# Patient Record
Sex: FEMALE | Race: WHITE | NOT HISPANIC OR LATINO
[De-identification: names, ages, dates, MRNs, and addresses within clinical notes are randomized per-mention and may not be internally consistent; named-entity substitution may affect disease eponyms.]

---

## 2017-01-23 ENCOUNTER — MEDICATION RENEWAL (OUTPATIENT)
Age: 72
End: 2017-01-23

## 2017-04-04 ENCOUNTER — APPOINTMENT (OUTPATIENT)
Dept: ENDOCRINOLOGY | Facility: CLINIC | Age: 72
End: 2017-04-04

## 2017-04-04 VITALS
BODY MASS INDEX: 40.12 KG/M2 | HEIGHT: 62 IN | WEIGHT: 218 LBS | DIASTOLIC BLOOD PRESSURE: 60 MMHG | OXYGEN SATURATION: 98 % | HEART RATE: 77 BPM | SYSTOLIC BLOOD PRESSURE: 140 MMHG

## 2017-06-06 ENCOUNTER — MEDICATION RENEWAL (OUTPATIENT)
Age: 72
End: 2017-06-06

## 2017-08-16 ENCOUNTER — APPOINTMENT (OUTPATIENT)
Dept: ENDOCRINOLOGY | Facility: CLINIC | Age: 72
End: 2017-08-16

## 2017-09-19 ENCOUNTER — RX RENEWAL (OUTPATIENT)
Age: 72
End: 2017-09-19

## 2017-10-19 ENCOUNTER — APPOINTMENT (OUTPATIENT)
Dept: ENDOCRINOLOGY | Facility: CLINIC | Age: 72
End: 2017-10-19
Payer: MEDICARE

## 2017-10-19 VITALS — HEART RATE: 72 BPM | DIASTOLIC BLOOD PRESSURE: 80 MMHG | SYSTOLIC BLOOD PRESSURE: 134 MMHG

## 2017-10-19 LAB
GLUCOSE BLDC GLUCOMTR-MCNC: 155
HBA1C MFR BLD HPLC: 8.3

## 2017-10-19 PROCEDURE — 99214 OFFICE O/P EST MOD 30 MIN: CPT | Mod: 25

## 2017-10-19 PROCEDURE — 82962 GLUCOSE BLOOD TEST: CPT

## 2017-10-19 PROCEDURE — 83036 HEMOGLOBIN GLYCOSYLATED A1C: CPT | Mod: QW

## 2018-03-01 ENCOUNTER — APPOINTMENT (OUTPATIENT)
Dept: ENDOCRINOLOGY | Facility: CLINIC | Age: 73
End: 2018-03-01

## 2018-03-02 ENCOUNTER — MEDICATION RENEWAL (OUTPATIENT)
Age: 73
End: 2018-03-02

## 2018-03-16 ENCOUNTER — APPOINTMENT (OUTPATIENT)
Dept: UROLOGY | Facility: CLINIC | Age: 73
End: 2018-03-16
Payer: MEDICARE

## 2018-03-16 ENCOUNTER — OUTPATIENT (OUTPATIENT)
Dept: OUTPATIENT SERVICES | Facility: HOSPITAL | Age: 73
LOS: 1 days | End: 2018-03-16
Payer: MEDICARE

## 2018-03-16 VITALS
TEMPERATURE: 98.2 F | BODY MASS INDEX: 40.12 KG/M2 | RESPIRATION RATE: 17 BRPM | HEART RATE: 91 BPM | DIASTOLIC BLOOD PRESSURE: 80 MMHG | WEIGHT: 218 LBS | HEIGHT: 62 IN | SYSTOLIC BLOOD PRESSURE: 191 MMHG

## 2018-03-16 DIAGNOSIS — R35.0 FREQUENCY OF MICTURITION: ICD-10-CM

## 2018-03-16 PROCEDURE — 76775 US EXAM ABDO BACK WALL LIM: CPT

## 2018-03-16 PROCEDURE — 76775 US EXAM ABDO BACK WALL LIM: CPT | Mod: 26

## 2018-03-16 PROCEDURE — 99213 OFFICE O/P EST LOW 20 MIN: CPT | Mod: 25

## 2018-03-19 DIAGNOSIS — R31.0 GROSS HEMATURIA: ICD-10-CM

## 2018-03-19 LAB — CORE LAB FLUID CYTOLOGY: NORMAL

## 2018-03-20 LAB
APPEARANCE: ABNORMAL
BACTERIA UR CULT: ABNORMAL
BACTERIA: ABNORMAL
BILIRUBIN URINE: NEGATIVE
BLOOD URINE: ABNORMAL
COLOR: YELLOW
GLUCOSE QUALITATIVE U: 500 MG/DL
HYALINE CASTS: 3 /LPF
KETONES URINE: NEGATIVE
LEUKOCYTE ESTERASE URINE: ABNORMAL
MICROSCOPIC-UA: NORMAL
NITRITE URINE: POSITIVE
PH URINE: 5
PROTEIN URINE: 100 MG/DL
RED BLOOD CELLS URINE: 3 /HPF
SPECIFIC GRAVITY URINE: 1.02
SQUAMOUS EPITHELIAL CELLS: 4 /HPF
UROBILINOGEN URINE: NEGATIVE MG/DL
WHITE BLOOD CELLS URINE: 249 /HPF

## 2018-04-08 ENCOUNTER — FORM ENCOUNTER (OUTPATIENT)
Age: 73
End: 2018-04-08

## 2018-04-09 ENCOUNTER — APPOINTMENT (OUTPATIENT)
Dept: CT IMAGING | Facility: IMAGING CENTER | Age: 73
End: 2018-04-09
Payer: MEDICARE

## 2018-04-09 ENCOUNTER — OUTPATIENT (OUTPATIENT)
Dept: OUTPATIENT SERVICES | Facility: HOSPITAL | Age: 73
LOS: 1 days | End: 2018-04-09
Payer: MEDICARE

## 2018-04-09 DIAGNOSIS — R31.0 GROSS HEMATURIA: ICD-10-CM

## 2018-04-09 PROCEDURE — 74176 CT ABD & PELVIS W/O CONTRAST: CPT | Mod: 26

## 2018-04-09 PROCEDURE — 74176 CT ABD & PELVIS W/O CONTRAST: CPT

## 2018-04-13 ENCOUNTER — APPOINTMENT (OUTPATIENT)
Dept: UROLOGY | Facility: CLINIC | Age: 73
End: 2018-04-13
Payer: MEDICARE

## 2018-04-13 ENCOUNTER — OUTPATIENT (OUTPATIENT)
Dept: OUTPATIENT SERVICES | Facility: HOSPITAL | Age: 73
LOS: 1 days | End: 2018-04-13
Payer: MEDICARE

## 2018-04-13 VITALS
DIASTOLIC BLOOD PRESSURE: 74 MMHG | BODY MASS INDEX: 40.12 KG/M2 | HEART RATE: 78 BPM | TEMPERATURE: 98.2 F | SYSTOLIC BLOOD PRESSURE: 174 MMHG | WEIGHT: 218 LBS | HEIGHT: 62 IN | RESPIRATION RATE: 17 BRPM

## 2018-04-13 DIAGNOSIS — R31.0 GROSS HEMATURIA: ICD-10-CM

## 2018-04-13 DIAGNOSIS — R35.0 FREQUENCY OF MICTURITION: ICD-10-CM

## 2018-04-13 PROCEDURE — 52000 CYSTOURETHROSCOPY: CPT

## 2018-04-15 LAB — BACTERIA UR CULT: NORMAL

## 2018-04-16 DIAGNOSIS — R31.0 GROSS HEMATURIA: ICD-10-CM

## 2018-04-18 ENCOUNTER — RX RENEWAL (OUTPATIENT)
Age: 73
End: 2018-04-18

## 2018-04-20 ENCOUNTER — APPOINTMENT (OUTPATIENT)
Dept: ENDOCRINOLOGY | Facility: CLINIC | Age: 73
End: 2018-04-20
Payer: MEDICARE

## 2018-04-20 VITALS
WEIGHT: 222 LBS | SYSTOLIC BLOOD PRESSURE: 124 MMHG | OXYGEN SATURATION: 98 % | HEIGHT: 62 IN | DIASTOLIC BLOOD PRESSURE: 76 MMHG | HEART RATE: 71 BPM | BODY MASS INDEX: 40.85 KG/M2

## 2018-04-20 PROCEDURE — 95251 CONT GLUC MNTR ANALYSIS I&R: CPT

## 2018-04-20 PROCEDURE — 99215 OFFICE O/P EST HI 40 MIN: CPT

## 2018-04-20 PROCEDURE — 95250 CONT GLUC MNTR PHYS/QHP EQP: CPT

## 2018-04-20 RX ORDER — AMOXICILLIN AND CLAVULANATE POTASSIUM 875; 125 MG/1; MG/1
875-125 TABLET, COATED ORAL
Qty: 14 | Refills: 0 | Status: DISCONTINUED | OUTPATIENT
Start: 2018-03-20 | End: 2018-04-20

## 2018-04-30 ENCOUNTER — APPOINTMENT (OUTPATIENT)
Dept: INTERNAL MEDICINE | Facility: CLINIC | Age: 73
End: 2018-04-30
Payer: MEDICARE

## 2018-04-30 VITALS
BODY MASS INDEX: 41.56 KG/M2 | HEART RATE: 76 BPM | HEIGHT: 61.5 IN | TEMPERATURE: 98.4 F | WEIGHT: 223 LBS | OXYGEN SATURATION: 93 %

## 2018-04-30 DIAGNOSIS — Z92.29 PERSONAL HISTORY OF OTHER DRUG THERAPY: ICD-10-CM

## 2018-04-30 DIAGNOSIS — R22.42 LOCALIZED SWELLING, MASS AND LUMP, LEFT LOWER LIMB: ICD-10-CM

## 2018-04-30 PROCEDURE — 99214 OFFICE O/P EST MOD 30 MIN: CPT

## 2018-04-30 PROCEDURE — G0439: CPT

## 2018-04-30 RX ORDER — PITAVASTATIN CALCIUM 4.18 MG/1
4 TABLET, FILM COATED ORAL
Qty: 30 | Refills: 0 | Status: DISCONTINUED | COMMUNITY
Start: 2017-10-26 | End: 2018-04-30

## 2018-04-30 RX ORDER — CAMPHOR 0.45 %
25 GEL (GRAM) TOPICAL ONCE
Qty: 2 | Refills: 0 | Status: DISCONTINUED | COMMUNITY
Start: 2018-03-16 | End: 2018-04-30

## 2018-04-30 RX ORDER — PREDNISONE 50 MG/1
50 TABLET ORAL EVERY 6 HOURS
Qty: 3 | Refills: 0 | Status: DISCONTINUED | COMMUNITY
Start: 2018-03-16 | End: 2018-04-30

## 2018-05-23 VITALS — DIASTOLIC BLOOD PRESSURE: 60 MMHG | SYSTOLIC BLOOD PRESSURE: 152 MMHG

## 2018-07-25 ENCOUNTER — APPOINTMENT (OUTPATIENT)
Dept: ENDOCRINOLOGY | Facility: CLINIC | Age: 73
End: 2018-07-25

## 2018-08-10 ENCOUNTER — APPOINTMENT (OUTPATIENT)
Dept: RADIOLOGY | Facility: IMAGING CENTER | Age: 73
End: 2018-08-10
Payer: MEDICARE

## 2018-08-10 ENCOUNTER — OUTPATIENT (OUTPATIENT)
Dept: OUTPATIENT SERVICES | Facility: HOSPITAL | Age: 73
LOS: 1 days | End: 2018-08-10
Payer: MEDICARE

## 2018-08-10 DIAGNOSIS — Z00.8 ENCOUNTER FOR OTHER GENERAL EXAMINATION: ICD-10-CM

## 2018-08-10 PROCEDURE — 74018 RADEX ABDOMEN 1 VIEW: CPT | Mod: 26

## 2018-08-10 PROCEDURE — 74018 RADEX ABDOMEN 1 VIEW: CPT

## 2018-10-04 ENCOUNTER — APPOINTMENT (OUTPATIENT)
Dept: INTERNAL MEDICINE | Facility: CLINIC | Age: 73
End: 2018-10-04
Payer: MEDICARE

## 2018-10-04 VITALS — SYSTOLIC BLOOD PRESSURE: 160 MMHG | DIASTOLIC BLOOD PRESSURE: 55 MMHG

## 2018-10-04 VITALS
WEIGHT: 211 LBS | HEART RATE: 90 BPM | HEIGHT: 61.5 IN | OXYGEN SATURATION: 93 % | TEMPERATURE: 99.2 F | BODY MASS INDEX: 39.33 KG/M2

## 2018-10-04 DIAGNOSIS — Z23 ENCOUNTER FOR IMMUNIZATION: ICD-10-CM

## 2018-10-04 PROCEDURE — 99214 OFFICE O/P EST MOD 30 MIN: CPT | Mod: 25

## 2018-10-04 PROCEDURE — 36415 COLL VENOUS BLD VENIPUNCTURE: CPT

## 2018-10-04 PROCEDURE — G0008: CPT

## 2018-10-04 PROCEDURE — 90662 IIV NO PRSV INCREASED AG IM: CPT

## 2018-10-04 NOTE — ASSESSMENT
[FreeTextEntry1] : The recent medical events were discussed and she is seeing her urologist.\par \par Discussed diet and exercise and she should try to lose weight.  Check lipids and a HgBA1c.  She is not on a statin due to muscle pains.  \par \par She is almost finished with a prolonged course of Vanco po for C.diff.  No diarrhea now.  \par \par The systolic BP is elevated but the diastolic is low.  She is seeing Dr. Martin next week.\par \par She is also going to see her pulmonologist and her ID specialist, Dr. Menon.\par \par Flu vaccine today.  \par \par She is going to see a new endocrinologist, Dr. Ariza.

## 2018-10-04 NOTE — PHYSICAL EXAM
[No Acute Distress] : no acute distress [Well Nourished] : well nourished [Well Developed] : well developed [No Respiratory Distress] : no respiratory distress  [Clear to Auscultation] : lungs were clear to auscultation bilaterally [No Accessory Muscle Use] : no accessory muscle use [Normal Rate] : normal rate  [Regular Rhythm] : with a regular rhythm [Normal S1, S2] : normal S1 and S2 [Pedal Pulses Present] : the pedal pulses are present [Soft] : abdomen soft [Non Tender] : non-tender [Non-distended] : non-distended [No HSM] : no HSM [Normal Gait] : normal gait [Coordination Grossly Intact] : coordination grossly intact [No Focal Deficits] : no focal deficits [de-identified] : Obese [de-identified] : Trace edema B/L

## 2018-10-16 ENCOUNTER — RX RENEWAL (OUTPATIENT)
Age: 73
End: 2018-10-16

## 2018-10-17 LAB
ALBUMIN SERPL ELPH-MCNC: 4.1 G/DL
ALP BLD-CCNC: 77 U/L
ALT SERPL-CCNC: 18 U/L
ANION GAP SERPL CALC-SCNC: 17 MMOL/L
AST SERPL-CCNC: 20 U/L
BASOPHILS # BLD AUTO: 0.05 K/UL
BASOPHILS NFR BLD AUTO: 0.3 %
BILIRUB SERPL-MCNC: 0.3 MG/DL
BUN SERPL-MCNC: 24 MG/DL
CALCIUM SERPL-MCNC: 10 MG/DL
CHLORIDE SERPL-SCNC: 94 MMOL/L
CHOLEST SERPL-MCNC: 277 MG/DL
CHOLEST/HDLC SERPL: 6 RATIO
CO2 SERPL-SCNC: 25 MMOL/L
CREAT SERPL-MCNC: 1.22 MG/DL
EOSINOPHIL # BLD AUTO: 0.33 K/UL
EOSINOPHIL NFR BLD AUTO: 2.1 %
FERRITIN SERPL-MCNC: 236 NG/ML
GLUCOSE SERPL-MCNC: 330 MG/DL
HBA1C MFR BLD HPLC: 7.4 %
HCT VFR BLD CALC: 32.8 %
HDLC SERPL-MCNC: 46 MG/DL
HGB BLD-MCNC: 9.7 G/DL
IMM GRANULOCYTES NFR BLD AUTO: 1.4 %
IRON SATN MFR SERPL: 13 %
IRON SERPL-MCNC: 42 UG/DL
LDLC SERPL CALC-MCNC: NORMAL
LYMPHOCYTES # BLD AUTO: 3.36 K/UL
LYMPHOCYTES NFR BLD AUTO: 21.7 %
MAN DIFF?: NORMAL
MCHC RBC-ENTMCNC: 26.5 PG
MCHC RBC-ENTMCNC: 29.6 GM/DL
MCV RBC AUTO: 89.6 FL
MONOCYTES # BLD AUTO: 0.85 K/UL
MONOCYTES NFR BLD AUTO: 5.5 %
NEUTROPHILS # BLD AUTO: 10.67 K/UL
NEUTROPHILS NFR BLD AUTO: 69 %
PLATELET # BLD AUTO: 402 K/UL
POTASSIUM SERPL-SCNC: 4.7 MMOL/L
PROT SERPL-MCNC: 7.8 G/DL
RBC # BLD: 3.66 M/UL
RBC # FLD: 16.1 %
SODIUM SERPL-SCNC: 136 MMOL/L
T4 FREE SERPL-MCNC: 1.2 NG/DL
TIBC SERPL-MCNC: 314 UG/DL
TRIGL SERPL-MCNC: 458 MG/DL
TSH SERPL-ACNC: 2.06 UIU/ML
UIBC SERPL-MCNC: 272 UG/DL
WBC # FLD AUTO: 15.47 K/UL

## 2018-11-13 ENCOUNTER — RX RENEWAL (OUTPATIENT)
Age: 73
End: 2018-11-13

## 2018-11-19 ENCOUNTER — APPOINTMENT (OUTPATIENT)
Dept: ENDOCRINOLOGY | Facility: CLINIC | Age: 73
End: 2018-11-19

## 2019-01-23 ENCOUNTER — RX RENEWAL (OUTPATIENT)
Age: 74
End: 2019-01-23

## 2019-03-06 ENCOUNTER — APPOINTMENT (OUTPATIENT)
Dept: ENDOCRINOLOGY | Facility: CLINIC | Age: 74
End: 2019-03-06

## 2019-04-12 ENCOUNTER — RX RENEWAL (OUTPATIENT)
Age: 74
End: 2019-04-12

## 2019-04-15 ENCOUNTER — APPOINTMENT (OUTPATIENT)
Dept: INTERNAL MEDICINE | Facility: CLINIC | Age: 74
End: 2019-04-15
Payer: MEDICARE

## 2019-04-15 VITALS
OXYGEN SATURATION: 93 % | HEART RATE: 79 BPM | TEMPERATURE: 97.4 F | HEIGHT: 61 IN | WEIGHT: 216 LBS | BODY MASS INDEX: 40.78 KG/M2

## 2019-04-15 VITALS — SYSTOLIC BLOOD PRESSURE: 150 MMHG | DIASTOLIC BLOOD PRESSURE: 60 MMHG

## 2019-04-15 VITALS — SYSTOLIC BLOOD PRESSURE: 145 MMHG | DIASTOLIC BLOOD PRESSURE: 55 MMHG

## 2019-04-15 DIAGNOSIS — Z23 ENCOUNTER FOR IMMUNIZATION: ICD-10-CM

## 2019-04-15 PROCEDURE — 90732 PPSV23 VACC 2 YRS+ SUBQ/IM: CPT

## 2019-04-15 PROCEDURE — G0009: CPT

## 2019-04-15 PROCEDURE — 99214 OFFICE O/P EST MOD 30 MIN: CPT | Mod: 25

## 2019-04-15 PROCEDURE — 36415 COLL VENOUS BLD VENIPUNCTURE: CPT

## 2019-04-15 RX ORDER — ROSUVASTATIN CALCIUM 5 MG/1
5 TABLET, FILM COATED ORAL
Qty: 30 | Refills: 5 | Status: DISCONTINUED | COMMUNITY
Start: 2018-04-30 | End: 2019-04-15

## 2019-04-15 NOTE — HISTORY OF PRESENT ILLNESS
[FreeTextEntry1] : The patient is here for a routine visit.  [de-identified] : She has not been careful with her diet.  She is unable to exercise given her neuropathy and other medical problems.  \par \par She cancelled two appointments with endocrinologists in the last few months and she has not tried to reschedule.  Glucoeses at home are 150-200+\par \par She has seen Dr. Martin.\par \par She last saw her urologist in the fall.  No new  symptoms.  \par \par She has seen her pulmonologist and she is off the supplemental oxygen.

## 2019-04-15 NOTE — ASSESSMENT
[FreeTextEntry1] : She has not seen an endocrinologist and she has not been careful with her diet.  She was urged to be more careful and see an endocrinologist- she had cancelled two appointments.  She says she is taking her medication.  Check a HgBA1c.  She sees an ophthalmologist. \par \par She continues to decline a statin due to aches.  Will try Zetia 10 mg.  Check lipids.\par \par The systolic BP is a little high but overall acceptable  She sees her cardiologist.\par \par Pneumovax today- had several years ago.  S/p Prevnar.\par \par She isn't on an ASA due to GI bleeding.  \par \par Check TFTs on Levothyroxine.  \par \par She has a 24/7 aide at home.

## 2019-04-15 NOTE — PHYSICAL EXAM
[No Acute Distress] : no acute distress [Well Nourished] : well nourished [Well Developed] : well developed [No Respiratory Distress] : no respiratory distress  [Clear to Auscultation] : lungs were clear to auscultation bilaterally [No Accessory Muscle Use] : no accessory muscle use [Normal Rate] : normal rate  [Regular Rhythm] : with a regular rhythm [Soft] : abdomen soft [Pedal Pulses Present] : the pedal pulses are present [Normal S1, S2] : normal S1 and S2 [Non-distended] : non-distended [Non Tender] : non-tender [No HSM] : no HSM [No Focal Deficits] : no focal deficits [Normal Gait] : normal gait [de-identified] : obese [de-identified] : 1+ edema B/L

## 2019-04-18 LAB
ALBUMIN SERPL ELPH-MCNC: 4.2 G/DL
ALP BLD-CCNC: 88 U/L
ALT SERPL-CCNC: 19 U/L
ANION GAP SERPL CALC-SCNC: 15 MMOL/L
AST SERPL-CCNC: 19 U/L
BASOPHILS # BLD AUTO: 0.07 K/UL
BASOPHILS NFR BLD AUTO: 0.4 %
BILIRUB SERPL-MCNC: 0.2 MG/DL
BUN SERPL-MCNC: 30 MG/DL
CALCIUM SERPL-MCNC: 9.7 MG/DL
CHLORIDE SERPL-SCNC: 99 MMOL/L
CHOLEST SERPL-MCNC: 295 MG/DL
CHOLEST/HDLC SERPL: 6.9 RATIO
CO2 SERPL-SCNC: 26 MMOL/L
CREAT SERPL-MCNC: 1.21 MG/DL
EOSINOPHIL # BLD AUTO: 0.2 K/UL
EOSINOPHIL NFR BLD AUTO: 1.1 %
ESTIMATED AVERAGE GLUCOSE: 209 MG/DL
GLUCOSE SERPL-MCNC: 290 MG/DL
HBA1C MFR BLD HPLC: 8.9 %
HCT VFR BLD CALC: 34.1 %
HCV AB SER QL: NONREACTIVE
HCV S/CO RATIO: 0.14 S/CO
HDLC SERPL-MCNC: 43 MG/DL
HGB BLD-MCNC: 9.8 G/DL
IMM GRANULOCYTES NFR BLD AUTO: 2 %
LDLC SERPL CALC-MCNC: NORMAL MG/DL
LYMPHOCYTES # BLD AUTO: 3 K/UL
LYMPHOCYTES NFR BLD AUTO: 17 %
MAN DIFF?: NORMAL
MCHC RBC-ENTMCNC: 26 PG
MCHC RBC-ENTMCNC: 28.7 GM/DL
MCV RBC AUTO: 90.5 FL
MONOCYTES # BLD AUTO: 0.86 K/UL
MONOCYTES NFR BLD AUTO: 4.9 %
NEUTROPHILS # BLD AUTO: 13.19 K/UL
NEUTROPHILS NFR BLD AUTO: 74.6 %
PLATELET # BLD AUTO: 346 K/UL
POTASSIUM SERPL-SCNC: 4.9 MMOL/L
PROT SERPL-MCNC: 6.9 G/DL
RBC # BLD: 3.77 M/UL
RBC # FLD: 15.2 %
SODIUM SERPL-SCNC: 140 MMOL/L
T4 FREE SERPL-MCNC: 1.2 NG/DL
TRIGL SERPL-MCNC: 414 MG/DL
TSH SERPL-ACNC: 2.1 UIU/ML
WBC # FLD AUTO: 17.67 K/UL

## 2019-05-10 ENCOUNTER — OUTPATIENT (OUTPATIENT)
Dept: OUTPATIENT SERVICES | Facility: HOSPITAL | Age: 74
LOS: 1 days | Discharge: HOME | End: 2019-05-10

## 2019-05-10 DIAGNOSIS — D72.829 ELEVATED WHITE BLOOD CELL COUNT, UNSPECIFIED: ICD-10-CM

## 2019-05-14 ENCOUNTER — RESULT REVIEW (OUTPATIENT)
Age: 74
End: 2019-05-14

## 2019-05-14 ENCOUNTER — APPOINTMENT (OUTPATIENT)
Dept: HEMATOLOGY ONCOLOGY | Facility: CLINIC | Age: 74
End: 2019-05-14
Payer: MEDICARE

## 2019-05-14 ENCOUNTER — OUTPATIENT (OUTPATIENT)
Dept: OUTPATIENT SERVICES | Facility: HOSPITAL | Age: 74
LOS: 1 days | End: 2019-05-14
Payer: MEDICARE

## 2019-05-14 VITALS
SYSTOLIC BLOOD PRESSURE: 156 MMHG | DIASTOLIC BLOOD PRESSURE: 84 MMHG | HEIGHT: 61.2 IN | TEMPERATURE: 98.6 F | HEART RATE: 75 BPM | OXYGEN SATURATION: 96 % | BODY MASS INDEX: 42.04 KG/M2 | RESPIRATION RATE: 16 BRPM | WEIGHT: 222.64 LBS

## 2019-05-14 DIAGNOSIS — Z63.4 DISAPPEARANCE AND DEATH OF FAMILY MEMBER: ICD-10-CM

## 2019-05-14 DIAGNOSIS — D72.829 ELEVATED WHITE BLOOD CELL COUNT, UNSPECIFIED: ICD-10-CM

## 2019-05-14 LAB
HCT VFR BLD CALC: 27.4 % — LOW (ref 34.5–45)
HGB BLD-MCNC: 10 G/DL — LOW (ref 11.5–15.5)
MCHC RBC-ENTMCNC: 28.9 PG — SIGNIFICANT CHANGE UP (ref 27–34)
MCHC RBC-ENTMCNC: 36.5 G/DL — HIGH (ref 32–36)
MCV RBC AUTO: 79.1 FL — LOW (ref 80–100)
PLATELET # BLD AUTO: 320 K/UL — SIGNIFICANT CHANGE UP (ref 150–400)
RBC # BLD: 3.47 M/UL — LOW (ref 3.8–5.2)
RBC # FLD: 14.5 % — SIGNIFICANT CHANGE UP (ref 10.3–14.5)
WBC # BLD: 16 K/UL — HIGH (ref 3.8–10.5)
WBC # FLD AUTO: 16 K/UL — HIGH (ref 3.8–10.5)

## 2019-05-14 PROCEDURE — 99205 OFFICE O/P NEW HI 60 MIN: CPT

## 2019-05-14 PROCEDURE — 81270 JAK2 GENE: CPT

## 2019-05-14 PROCEDURE — 81207 BCR/ABL1 GENE MINOR BP: CPT

## 2019-05-14 PROCEDURE — 88184 FLOWCYTOMETRY/ TC 1 MARKER: CPT

## 2019-05-14 PROCEDURE — 88185 FLOWCYTOMETRY/TC ADD-ON: CPT

## 2019-05-14 PROCEDURE — 87205 SMEAR GRAM STAIN: CPT

## 2019-05-14 PROCEDURE — G0452: CPT | Mod: 26

## 2019-05-14 PROCEDURE — 81206 BCR/ABL1 GENE MAJOR BP: CPT

## 2019-05-14 RX ORDER — EZETIMIBE 10 MG/1
10 TABLET ORAL
Qty: 90 | Refills: 3 | Status: DISCONTINUED | COMMUNITY
Start: 2019-04-15 | End: 2019-05-14

## 2019-05-14 SDOH — SOCIAL STABILITY - SOCIAL INSECURITY: DISSAPEARANCE AND DEATH OF FAMILY MEMBER: Z63.4

## 2019-05-14 NOTE — REVIEW OF SYSTEMS
[Fatigue] : fatigue [Lower Ext Edema] : lower extremity edema [Shortness Of Breath] : shortness of breath [SOB on Exertion] : shortness of breath during exertion [Dysuria] : dysuria [Joint Stiffness] : joint stiffness [Difficulty Walking] : difficulty walking [Anxiety] : anxiety [Depression] : depression [Easy Bruising] : a tendency for easy bruising [Negative] : Allergic/Immunologic [Night Sweats] : no night sweats [Fever] : no fever [Chills] : no chills [Eye Pain] : no eye pain [Red Eyes] : eyes not red [Recent Change In Weight] : ~T no recent weight change [Dysphagia] : no dysphagia [Dry Eyes] : no dryness of the eyes [Vision Problems] : no vision problems [Loss of Hearing] : no loss of hearing [Hoarseness] : no hoarseness [Nosebleeds] : no nosebleeds [Odynophagia] : no odynophagia [Mucosal Pain] : no mucosal pain [Chest Pain] : no chest pain [Wheezing] : no wheezing [Leg Claudication] : no intermittent leg claudication [Palpitations] : no palpitations [Cough] : no cough [Vaginal Discharge] : no vaginal discharge [Incontinence] : no incontinence [Joint Pain] : no joint pain [Muscle Pain] : no muscle pain [Dysmenorrhea/Abn Vaginal Bleeding] : no dysmenorrhea/abnormal vaginal bleeding [Skin Rash] : no skin rash [Dizziness] : no dizziness [Confused] : no confusion [Skin Wound] : no skin wound [Suicidal] : not suicidal [Fainting] : no fainting [Insomnia] : no insomnia [Hot Flashes] : no hot flashes [Proptosis] : no proptosis [Easy Bleeding] : no tendency for easy bleeding [Deepening Of The Voice] : no deepening of the voice [Muscle Weakness] : no muscle weakness [FreeTextEntry3] : CL until age 62; difficulty to read [Swollen Glands] : no swollen glands [FreeTextEntry9] : legs since last summer [FreeTextEntry6] : does not climb stairs [FreeTextEntry8] : kidney stone [de-identified] : walker used at home [de-identified] :   [FreeTextEntry1] : sensitive to chemicals, cigarettes and cats

## 2019-05-14 NOTE — RESULTS/DATA
[FreeTextEntry1] : WBC 16.0 HGB 10  000\par review of peripheral blood smear shows occasional large granulocytic cell including at least one myelocyte.No basophils are observed and no myeloblasts are noted. ;consideration of promyelocytes or LGL.

## 2019-05-14 NOTE — CONSULT LETTER
[Consult Letter:] : I had the pleasure of evaluating your patient, [unfilled]. [Dear  ___] : Dear  [unfilled], [Sincerely,] : Sincerely, [Consult Closing:] : Thank you very much for allowing me to participate in the care of this patient.  If you have any questions, please do not hesitate to contact me. [Please see my note below.] : Please see my note below. [FreeTextEntry2] : Cale Tipton MD\par [address]\par [facility] [address]\par [facility] [address] [FreeTextEntry3] : Donaldo Cheng

## 2019-05-14 NOTE — ASSESSMENT
[Supportive] : Goals of care discussed with patient: Supportive [Palliative Care Plan] : not applicable at this time [FreeTextEntry1] : 74 year old female with a history dating to 2013 of mild anemia and mild elevation of white cell count. She had a bone marrow aspiration and biopsy over 20 years ago in [place] to evaluate her anemia but she is unaware of the results as she did not return for follow up. She is not aware of white cell elevation at that time. She occasionally took prednisone but not in recent several months. Recommend lymphocyte flow cytometry, GUILHERME 2 and BCR ABL testing.\par She has a physical examination which is unchanged from prior records. There is no lymphadenopathy. I cannot palpate any abdominal organ enlargement due to voluntary guarding\par Patient seen with A Ricardo VALERIO\par RTC 2 weeks

## 2019-05-14 NOTE — HISTORY OF PRESENT ILLNESS
[Cardiovascular] : Cardiovascular [Constitutional] : Constitutional [ENT] : ENT [Dermatologic] : Dermatologic [Endocrine] : Endocrine [Genitourinary] : Genitourinary [Gastrointestinal] : Gastrointestinal [Musculoskeletal] : Musculoskeletal [Gynecologic] : Gynecologic [Infectious] : Infectious [Pain] : Pain [Pulmonary] : Pulmonary [Neurologic] : Neurologic [Date: ____________] : Patient's last distress assessment performed on [unfilled]. [Hematologic] : Hematologic [0 - No Distress] : Distress Level: 0 [Disease:__________________________] : Disease: [unfilled] [de-identified] : The patient was referred by Dr Tipton for elevation of the white cell count.\par The patient was seen in [facility] on 06/29 /2018 and she remained in the hospital for 30 days. She had kidney stone, sepsis; she had coma for 3 days. She remained on antibiotics. She had a readmission to [facility] in July 2018 for C diff (this was after discharge from rehabilitation). 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 She has had mild anemia in association with her diagnosis of diabetes. She is not diagnosed to have renal insufficiency although her predicted G F R is 40 ml/min. \par She was diagnosed to have diabetes 10 years ago and she was placed on insulin about 4 years ago when she had kidney stones. \par Patient also admitted to\par Basal cell cancer resection above left eyebrow 15 years ago \par  [FreeTextEntry1] : initial visit [de-identified] : She has had significant fatigue particularly with walking since 10 months ago and her three hospitalizations. She monitors her blood sugar but occasional non fasting blood sugars were noted at 290 in office visits. She has not had transfusion since her hospitalzation. hemoglobins have been below normal since 2013. THere is no history of urinary bleeding or gastrointestinal bleeding

## 2019-05-15 LAB — TM INTERPRETATION: SIGNIFICANT CHANGE UP

## 2019-05-20 LAB — BCR/ABL BY RT - PCR QUANTITATIVE: SIGNIFICANT CHANGE UP

## 2019-05-24 LAB — JAK2 P.V617F BLD/T QL: SIGNIFICANT CHANGE UP

## 2019-06-08 ENCOUNTER — OUTPATIENT (OUTPATIENT)
Dept: OUTPATIENT SERVICES | Facility: HOSPITAL | Age: 74
LOS: 1 days | Discharge: HOME | End: 2019-06-08

## 2019-06-08 DIAGNOSIS — D72.829 ELEVATED WHITE BLOOD CELL COUNT, UNSPECIFIED: ICD-10-CM

## 2019-06-10 NOTE — HISTORY OF PRESENT ILLNESS
[Disease:__________________________] : Disease: [unfilled] [de-identified] : The patient was referred by Dr Tipton for elevation of the white cell count.\par The patient was seen in [facility] on 06/29 /2018 and she remained in the hospital for 30 days. She had kidney stone, sepsis; she had coma for 3 days. She remained on antibiotics. She had a readmission to [facility] in July 2018 for C diff (this was after discharge from rehabilitation). 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 She has had mild anemia in association with her diagnosis of diabetes. She is not diagnosed to have renal insufficiency although her predicted G F R is 40 ml/min. \par She was diagnosed to have diabetes 10 years ago and she was placed on insulin about 4 years ago when she had kidney stones. \par Patient also admitted to\par Basal cell cancer resection above left eyebrow 15 years ago \par

## 2019-06-12 ENCOUNTER — APPOINTMENT (OUTPATIENT)
Dept: HEMATOLOGY ONCOLOGY | Facility: CLINIC | Age: 74
End: 2019-06-12
Payer: MEDICARE

## 2019-06-19 ENCOUNTER — APPOINTMENT (OUTPATIENT)
Dept: HEMATOLOGY ONCOLOGY | Facility: CLINIC | Age: 74
End: 2019-06-19
Payer: MEDICARE

## 2019-07-05 ENCOUNTER — RX RENEWAL (OUTPATIENT)
Age: 74
End: 2019-07-05

## 2019-07-15 ENCOUNTER — OUTPATIENT (OUTPATIENT)
Dept: OUTPATIENT SERVICES | Facility: HOSPITAL | Age: 74
LOS: 1 days | Discharge: HOME | End: 2019-07-15

## 2019-07-15 DIAGNOSIS — D72.829 ELEVATED WHITE BLOOD CELL COUNT, UNSPECIFIED: ICD-10-CM

## 2019-07-24 ENCOUNTER — RESULT REVIEW (OUTPATIENT)
Age: 74
End: 2019-07-24

## 2019-07-24 ENCOUNTER — APPOINTMENT (OUTPATIENT)
Dept: HEMATOLOGY ONCOLOGY | Facility: CLINIC | Age: 74
End: 2019-07-24
Payer: MEDICARE

## 2019-07-24 VITALS
HEART RATE: 72 BPM | SYSTOLIC BLOOD PRESSURE: 159 MMHG | OXYGEN SATURATION: 98 % | TEMPERATURE: 98.8 F | WEIGHT: 219.36 LBS | RESPIRATION RATE: 16 BRPM | BODY MASS INDEX: 41.18 KG/M2 | DIASTOLIC BLOOD PRESSURE: 74 MMHG

## 2019-07-24 LAB
BASOPHILS # BLD AUTO: 0 K/UL — SIGNIFICANT CHANGE UP (ref 0–0.2)
BASOPHILS NFR BLD AUTO: 0.3 % — SIGNIFICANT CHANGE UP (ref 0–2)
EOSINOPHIL # BLD AUTO: 0.3 K/UL — SIGNIFICANT CHANGE UP (ref 0–0.5)
EOSINOPHIL NFR BLD AUTO: 2.1 % — SIGNIFICANT CHANGE UP (ref 0–6)
HCT VFR BLD CALC: 29.8 % — LOW (ref 34.5–45)
HGB BLD-MCNC: 9.8 G/DL — LOW (ref 11.5–15.5)
LYMPHOCYTES # BLD AUTO: 23 % — SIGNIFICANT CHANGE UP (ref 13–44)
LYMPHOCYTES # BLD AUTO: 3 K/UL — SIGNIFICANT CHANGE UP (ref 1–3.3)
MCHC RBC-ENTMCNC: 26.5 PG — LOW (ref 27–34)
MCHC RBC-ENTMCNC: 32.7 G/DL — SIGNIFICANT CHANGE UP (ref 32–36)
MCV RBC AUTO: 81 FL — SIGNIFICANT CHANGE UP (ref 80–100)
MONOCYTES # BLD AUTO: 0.7 K/UL — SIGNIFICANT CHANGE UP (ref 0–0.9)
MONOCYTES NFR BLD AUTO: 5.2 % — SIGNIFICANT CHANGE UP (ref 2–14)
NEUTROPHILS # BLD AUTO: 9 K/UL — HIGH (ref 1.8–7.4)
NEUTROPHILS NFR BLD AUTO: 69.4 % — SIGNIFICANT CHANGE UP (ref 43–77)
PLATELET # BLD AUTO: 323 K/UL — SIGNIFICANT CHANGE UP (ref 150–400)
RBC # BLD: 3.68 M/UL — LOW (ref 3.8–5.2)
RBC # FLD: 15 % — HIGH (ref 10.3–14.5)
WBC # BLD: 13 K/UL — HIGH (ref 3.8–10.5)
WBC # FLD AUTO: 13 K/UL — HIGH (ref 3.8–10.5)

## 2019-07-24 PROCEDURE — 99214 OFFICE O/P EST MOD 30 MIN: CPT

## 2019-07-24 NOTE — HISTORY OF PRESENT ILLNESS
[Disease:__________________________] : Disease: [unfilled] [Cardiovascular] : Cardiovascular [ENT] : ENT [Constitutional] : Constitutional [Dermatologic] : Dermatologic [Endocrine] : Endocrine [Gastrointestinal] : Gastrointestinal [Gynecologic] : Gynecologic [Infectious] : Infectious [Genitourinary] : Genitourinary [Neurologic] : Neurologic [Musculoskeletal] : Musculoskeletal [Pain] : Pain [Pulmonary] : Pulmonary [Date: ____________] : Patient's last distress assessment performed on [unfilled]. [Hematologic] : Hematologic [0 - No Distress] : Distress Level: 0 [de-identified] : The patient was referred by Dr Tipton for elevation of the white cell count.\par The patient was seen in [facility] on 06/29 /2018 and she remained in the hospital for 30 days. She had kidney stone, sepsis; she had coma for 3 days. She remained on antibiotics. She had a readmission to [facility] in July 2018 for C diff (this was after discharge from rehabilitation). 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 She has had mild anemia in association with her diagnosis of diabetes. She is not diagnosed to have renal insufficiency although her predicted G F R is 40 ml/min. \par She was diagnosed to have diabetes 10 years ago and she was placed on insulin about 4 years ago when she had kidney stones. \par Patient also admitted to\par Basal cell cancer resection above left eyebrow 15 years ago \par  [de-identified] : She has not had hospitalization since her last visit in may 2019; She has tested normal for J AK, B CR A B L, and flow cytometry,\par She has noted subcutaneous on thighs and upper arms. The patient has given insulin only in the arms.\par No fever and no weight loss [FreeTextEntry1] : follow up visit

## 2019-07-24 NOTE — PHYSICAL EXAM
[Restricted in physically strenuous activity but ambulatory and able to carry out work of a light or sedentary nature] : Status 1- Restricted in physically strenuous activity but ambulatory and able to carry out work of a light or sedentary nature, e.g., light house work, office work [Normal] : affect appropriate [de-identified] : patient refused physical examination

## 2019-07-24 NOTE — RESULTS/DATA
[FreeTextEntry1] : copies of normal leucocyte flow cytometry, J A K 2 , B CR A B L given to the patient with an explanation.\par WBC 13 HGB 9.8  000

## 2019-07-24 NOTE — REVIEW OF SYSTEMS
[Fatigue] : fatigue [Vision Problems] : vision problems [Shortness Of Breath] : shortness of breath [Lower Ext Edema] : lower extremity edema [SOB on Exertion] : shortness of breath during exertion [Anxiety] : anxiety [Difficulty Walking] : difficulty walking [Swollen Glands] : swollen glands [Negative] : Allergic/Immunologic [Skin Rash] : no skin rash [Depression] : no depression [Proptosis] : no proptosis [Skin Wound] : no skin wound [Muscle Weakness] : no muscle weakness [Hot Flashes] : no hot flashes [Easy Bleeding] : no tendency for easy bleeding [Deepening Of The Voice] : no deepening of the voice [Easy Bruising] : no tendency for easy bruising [de-identified] : she uses a cane [de-identified] : hypothyroidism

## 2019-07-24 NOTE — ASSESSMENT
[Supportive] : Goals of care discussed with patient: Supportive [Palliative Care Plan] : not applicable at this time [FreeTextEntry1] : 74 year old female with a history of hypothyroidism and an elevation of the white cell count. Examination of the peripheral smear showed normal findings.\par Causes of mild anemia present since 2016 may include diabetes and chronic disease. According to the patient she has not had bleeding in rectum. She did had kidNEy bleeding and she was transfused at [facility].\par The patient probably has anemia of chronic disease (diabetes). White blood cell elevation is showing normal findings on flow cytometry.\par She will continue on her current diet and monitor her blood sugar. Vital signs were reviewed and patient is reminded to continue on her blood pressure medication.\par RTC 6 months

## 2019-08-14 ENCOUNTER — OUTPATIENT (OUTPATIENT)
Dept: OUTPATIENT SERVICES | Facility: HOSPITAL | Age: 74
LOS: 1 days | End: 2019-08-14
Payer: MEDICARE

## 2019-08-14 ENCOUNTER — APPOINTMENT (OUTPATIENT)
Dept: ULTRASOUND IMAGING | Facility: CLINIC | Age: 74
End: 2019-08-14
Payer: MEDICARE

## 2019-08-14 DIAGNOSIS — N20.0 CALCULUS OF KIDNEY: ICD-10-CM

## 2019-08-14 PROCEDURE — 76775 US EXAM ABDO BACK WALL LIM: CPT | Mod: 26

## 2019-08-14 PROCEDURE — 76775 US EXAM ABDO BACK WALL LIM: CPT

## 2019-09-17 ENCOUNTER — RX RENEWAL (OUTPATIENT)
Age: 74
End: 2019-09-17

## 2019-10-10 ENCOUNTER — RX RENEWAL (OUTPATIENT)
Age: 74
End: 2019-10-10

## 2019-10-10 ENCOUNTER — APPOINTMENT (OUTPATIENT)
Dept: ENDOCRINOLOGY | Facility: CLINIC | Age: 74
End: 2019-10-10
Payer: MEDICARE

## 2019-10-10 VITALS
DIASTOLIC BLOOD PRESSURE: 60 MMHG | OXYGEN SATURATION: 98 % | BODY MASS INDEX: 40.55 KG/M2 | SYSTOLIC BLOOD PRESSURE: 132 MMHG | HEART RATE: 86 BPM | WEIGHT: 216 LBS

## 2019-10-10 LAB — GLUCOSE BLDC GLUCOMTR-MCNC: 333

## 2019-10-10 PROCEDURE — 83036 HEMOGLOBIN GLYCOSYLATED A1C: CPT | Mod: QW

## 2019-10-10 PROCEDURE — 82962 GLUCOSE BLOOD TEST: CPT

## 2019-10-10 PROCEDURE — 99214 OFFICE O/P EST MOD 30 MIN: CPT | Mod: 25

## 2019-10-11 ENCOUNTER — RESULT CHARGE (OUTPATIENT)
Age: 74
End: 2019-10-11

## 2019-10-14 LAB — HBA1C MFR BLD HPLC: 7.8

## 2019-10-25 ENCOUNTER — RX RENEWAL (OUTPATIENT)
Age: 74
End: 2019-10-25

## 2019-11-05 ENCOUNTER — RX RENEWAL (OUTPATIENT)
Age: 74
End: 2019-11-05

## 2019-11-15 ENCOUNTER — APPOINTMENT (OUTPATIENT)
Dept: INTERNAL MEDICINE | Facility: CLINIC | Age: 74
End: 2019-11-15
Payer: MEDICARE

## 2019-11-15 VITALS — DIASTOLIC BLOOD PRESSURE: 60 MMHG | SYSTOLIC BLOOD PRESSURE: 145 MMHG

## 2019-11-15 VITALS
BODY MASS INDEX: 40.4 KG/M2 | HEART RATE: 83 BPM | TEMPERATURE: 98 F | HEIGHT: 61 IN | WEIGHT: 214 LBS | OXYGEN SATURATION: 94 %

## 2019-11-15 PROCEDURE — G0008: CPT

## 2019-11-15 PROCEDURE — 90653 IIV ADJUVANT VACCINE IM: CPT

## 2019-11-15 PROCEDURE — 99214 OFFICE O/P EST MOD 30 MIN: CPT | Mod: 25

## 2019-11-15 PROCEDURE — 36415 COLL VENOUS BLD VENIPUNCTURE: CPT

## 2019-11-15 NOTE — ASSESSMENT
[FreeTextEntry1] : The BP is fairly good for her at 145/60.\par \par Check lipids- she can't tolerate a statin or Zetia.  Consider other medication options?  Consider the lipid center.  \par \par She requests a HgBA1c- checked a month ago and it was 7.8. Discussed diet.  Note she is not taking the Repaglinide that was prescribed last month.  She is also taking the Janumet QD (not BID as prescribed).  She is taking the insulin.\par \par Flu vaccine today.\par \par Check TFTs on Levothyroxine.\par \par She sees her cardiologist, Dr. Martin.

## 2019-11-15 NOTE — HISTORY OF PRESENT ILLNESS
[de-identified] : The patient is here for a routine visit. She says her diet has been better.  She is unable to exercise.\par \par She has a 24/7 aide at home.  She walks with a walker.  \par \par She saw Dr. Cheng for the elevated WBC and the anemia.  No new pathology found.

## 2019-11-20 LAB
ALBUMIN SERPL ELPH-MCNC: 4.5 G/DL
ALP BLD-CCNC: 89 U/L
ALT SERPL-CCNC: 21 U/L
ANION GAP SERPL CALC-SCNC: 17 MMOL/L
AST SERPL-CCNC: 21 U/L
BILIRUB SERPL-MCNC: 0.2 MG/DL
BUN SERPL-MCNC: 31 MG/DL
CALCIUM SERPL-MCNC: 9.9 MG/DL
CHLORIDE SERPL-SCNC: 97 MMOL/L
CHOLEST SERPL-MCNC: 295 MG/DL
CHOLEST/HDLC SERPL: 6.3 RATIO
CO2 SERPL-SCNC: 25 MMOL/L
CREAT SERPL-MCNC: 1.19 MG/DL
ESTIMATED AVERAGE GLUCOSE: 189 MG/DL
GLUCOSE SERPL-MCNC: 135 MG/DL
HBA1C MFR BLD HPLC: 8.2 %
HDLC SERPL-MCNC: 47 MG/DL
LDLC SERPL CALC-MCNC: 178 MG/DL
POTASSIUM SERPL-SCNC: 4.4 MMOL/L
PROT SERPL-MCNC: 6.9 G/DL
SODIUM SERPL-SCNC: 139 MMOL/L
T4 FREE SERPL-MCNC: 1.3 NG/DL
TRIGL SERPL-MCNC: 348 MG/DL
TSH SERPL-ACNC: 1.61 UIU/ML

## 2019-11-20 RX ORDER — AZILSARTAN KAMEDOXOMIL AND CHLORTHALIDONE 40; 25 MG/1; MG/1
40-25 TABLET ORAL DAILY
Qty: 90 | Refills: 3 | Status: ACTIVE | COMMUNITY
Start: 2019-11-20

## 2020-01-06 ENCOUNTER — RX RENEWAL (OUTPATIENT)
Age: 75
End: 2020-01-06

## 2020-01-15 ENCOUNTER — APPOINTMENT (OUTPATIENT)
Dept: ENDOCRINOLOGY | Facility: CLINIC | Age: 75
End: 2020-01-15

## 2020-02-03 ENCOUNTER — APPOINTMENT (OUTPATIENT)
Dept: INTERNAL MEDICINE | Facility: CLINIC | Age: 75
End: 2020-02-03
Payer: MEDICARE

## 2020-02-03 VITALS
HEIGHT: 61 IN | HEART RATE: 78 BPM | WEIGHT: 216 LBS | OXYGEN SATURATION: 94 % | BODY MASS INDEX: 40.78 KG/M2 | SYSTOLIC BLOOD PRESSURE: 140 MMHG | TEMPERATURE: 98 F | DIASTOLIC BLOOD PRESSURE: 60 MMHG

## 2020-02-03 PROCEDURE — 99214 OFFICE O/P EST MOD 30 MIN: CPT | Mod: 25

## 2020-02-03 PROCEDURE — 36415 COLL VENOUS BLD VENIPUNCTURE: CPT

## 2020-02-06 VITALS — DIASTOLIC BLOOD PRESSURE: 65 MMHG | SYSTOLIC BLOOD PRESSURE: 145 MMHG

## 2020-02-06 NOTE — HISTORY OF PRESENT ILLNESS
[de-identified] : The patient comes in because she is having dyspnea with exertion which occurs with even mild exertion.  She wonders if she is more anemic.  She has had this for a few months and it is not acute.  No chest pain or pressure, no fever, cough, orthopnea.  She has lower extremity edema which is the same.  She saw Dr. Martin in the last few months.  \par \par She has not been good with her diet and she is unable to exercise.  \par \par She has leg and back pain and she might want to see a neurologist.

## 2020-02-06 NOTE — ASSESSMENT
[FreeTextEntry1] : The patient has dyspnea with mild exertion which is not new- present for a few months.  She comes in primarily because she is concerned about worse anemia- note she sees a hematologist although she has not been there recently.  She doesn't specifically have chest pain or pressure.  The symptoms could be multifactorial and will check a CBC for the anemia and a metabolic, TFTs, BNP.  Consider a cardiac component and she was advised to follow-up with Dr. Martin.  Also check a CXR to rule out CHF and check a BNP.  Could consider a pulmonary evaluation.  Call PRN if it gets worse.\par \par Check a HgBA1c and lipids with the other blood tests.  Diet has been poor and she was counseled.  \par \par She has had the flu vaccine.

## 2020-02-06 NOTE — PHYSICAL EXAM
[Normal] : normal gait, coordination grossly intact, no focal deficits and deep tendon reflexes were 2+ and symmetric [de-identified] : obese female [de-identified] : soft systolic murmur [de-identified] : B/L 1-2 + edema.

## 2020-02-12 LAB
ALBUMIN SERPL ELPH-MCNC: 4.4 G/DL
ALP BLD-CCNC: 89 U/L
ALT SERPL-CCNC: 19 U/L
ANION GAP SERPL CALC-SCNC: 15 MMOL/L
AST SERPL-CCNC: 19 U/L
BASOPHILS # BLD AUTO: 0.06 K/UL
BASOPHILS NFR BLD AUTO: 0.4 %
BILIRUB SERPL-MCNC: 0.2 MG/DL
BUN SERPL-MCNC: 29 MG/DL
CALCIUM SERPL-MCNC: 9.7 MG/DL
CHLORIDE SERPL-SCNC: 98 MMOL/L
CHOLEST SERPL-MCNC: 280 MG/DL
CHOLEST/HDLC SERPL: 6.2 RATIO
CO2 SERPL-SCNC: 26 MMOL/L
CREAT SERPL-MCNC: 1.51 MG/DL
EOSINOPHIL # BLD AUTO: 0.25 K/UL
EOSINOPHIL NFR BLD AUTO: 1.6 %
ESTIMATED AVERAGE GLUCOSE: 177 MG/DL
FERRITIN SERPL-MCNC: 114 NG/ML
FOLATE SERPL-MCNC: 8.4 NG/ML
GLUCOSE SERPL-MCNC: 147 MG/DL
HBA1C MFR BLD HPLC: 7.8 %
HCT VFR BLD CALC: 30.8 %
HDLC SERPL-MCNC: 45 MG/DL
HGB BLD-MCNC: 9.1 G/DL
IMM GRANULOCYTES NFR BLD AUTO: 2.4 %
IRON SATN MFR SERPL: 9 %
IRON SERPL-MCNC: 30 UG/DL
LDLC SERPL CALC-MCNC: 177 MG/DL
LYMPHOCYTES # BLD AUTO: 3.05 K/UL
LYMPHOCYTES NFR BLD AUTO: 19.8 %
MAN DIFF?: NORMAL
MCHC RBC-ENTMCNC: 25.4 PG
MCHC RBC-ENTMCNC: 29.5 GM/DL
MCV RBC AUTO: 86 FL
MONOCYTES # BLD AUTO: 0.85 K/UL
MONOCYTES NFR BLD AUTO: 5.5 %
NEUTROPHILS # BLD AUTO: 10.79 K/UL
NEUTROPHILS NFR BLD AUTO: 70.3 %
NT-PROBNP SERPL-MCNC: 78 PG/ML
PLATELET # BLD AUTO: 370 K/UL
POTASSIUM SERPL-SCNC: 5 MMOL/L
PROT SERPL-MCNC: 6.8 G/DL
RBC # BLD: 3.58 M/UL
RBC # FLD: 15.9 %
SODIUM SERPL-SCNC: 139 MMOL/L
T4 FREE SERPL-MCNC: 1.4 NG/DL
TIBC SERPL-MCNC: 316 UG/DL
TRIGL SERPL-MCNC: 287 MG/DL
TSH SERPL-ACNC: 1.62 UIU/ML
UIBC SERPL-MCNC: 286 UG/DL
VIT B12 SERPL-MCNC: 649 PG/ML
WBC # FLD AUTO: 15.37 K/UL

## 2020-04-27 ENCOUNTER — RX RENEWAL (OUTPATIENT)
Age: 75
End: 2020-04-27

## 2020-05-26 ENCOUNTER — APPOINTMENT (OUTPATIENT)
Dept: INTERNAL MEDICINE | Facility: CLINIC | Age: 75
End: 2020-05-26
Payer: MEDICARE

## 2020-05-26 VITALS
OXYGEN SATURATION: 98 % | SYSTOLIC BLOOD PRESSURE: 150 MMHG | HEIGHT: 61 IN | DIASTOLIC BLOOD PRESSURE: 85 MMHG | HEART RATE: 88 BPM | TEMPERATURE: 98 F | BODY MASS INDEX: 40.4 KG/M2 | WEIGHT: 214 LBS

## 2020-05-26 DIAGNOSIS — Z11.59 ENCOUNTER FOR SCREENING FOR OTHER VIRAL DISEASES: ICD-10-CM

## 2020-05-26 PROCEDURE — 36415 COLL VENOUS BLD VENIPUNCTURE: CPT

## 2020-05-26 PROCEDURE — 99214 OFFICE O/P EST MOD 30 MIN: CPT | Mod: 25

## 2020-06-02 VITALS — SYSTOLIC BLOOD PRESSURE: 140 MMHG | DIASTOLIC BLOOD PRESSURE: 65 MMHG

## 2020-06-02 LAB
ALBUMIN SERPL ELPH-MCNC: 4.5 G/DL
ALP BLD-CCNC: 85 U/L
ALT SERPL-CCNC: 27 U/L
ANION GAP SERPL CALC-SCNC: 15 MMOL/L
AST SERPL-CCNC: 26 U/L
BASOPHILS # BLD AUTO: 0.07 K/UL
BASOPHILS NFR BLD AUTO: 0.6 %
BILIRUB SERPL-MCNC: 0.3 MG/DL
BUN SERPL-MCNC: 30 MG/DL
CALCIUM SERPL-MCNC: 9.9 MG/DL
CHLORIDE SERPL-SCNC: 96 MMOL/L
CHOLEST SERPL-MCNC: 298 MG/DL
CHOLEST/HDLC SERPL: 6.3 RATIO
CO2 SERPL-SCNC: 28 MMOL/L
CREAT SERPL-MCNC: 1.11 MG/DL
EOSINOPHIL # BLD AUTO: 0.28 K/UL
EOSINOPHIL NFR BLD AUTO: 2.4 %
ESTIMATED AVERAGE GLUCOSE: 171 MG/DL
GLUCOSE SERPL-MCNC: 143 MG/DL
HBA1C MFR BLD HPLC: 7.6 %
HCT VFR BLD CALC: 32.3 %
HDLC SERPL-MCNC: 47 MG/DL
HGB BLD-MCNC: 9.7 G/DL
IMM GRANULOCYTES NFR BLD AUTO: 2.2 %
LDLC SERPL CALC-MCNC: 186 MG/DL
LYMPHOCYTES # BLD AUTO: 2.79 K/UL
LYMPHOCYTES NFR BLD AUTO: 23.5 %
MAN DIFF?: NORMAL
MCHC RBC-ENTMCNC: 25.3 PG
MCHC RBC-ENTMCNC: 30 GM/DL
MCV RBC AUTO: 84.1 FL
MONOCYTES # BLD AUTO: 0.72 K/UL
MONOCYTES NFR BLD AUTO: 6.1 %
NEUTROPHILS # BLD AUTO: 7.75 K/UL
NEUTROPHILS NFR BLD AUTO: 65.2 %
PLATELET # BLD AUTO: 364 K/UL
POTASSIUM SERPL-SCNC: 4.7 MMOL/L
PROT SERPL-MCNC: 7.2 G/DL
RBC # BLD: 3.84 M/UL
RBC # FLD: 16.8 %
SARS-COV-2 IGG SERPL IA-ACNC: 6.77 INDEX
SARS-COV-2 IGG SERPL QL IA: POSITIVE
SODIUM SERPL-SCNC: 139 MMOL/L
T4 FREE SERPL-MCNC: 1.1 NG/DL
TRIGL SERPL-MCNC: 324 MG/DL
TSH SERPL-ACNC: 2.15 UIU/ML
WBC # FLD AUTO: 11.87 K/UL

## 2020-06-02 NOTE — PHYSICAL EXAM
[Normal] : soft, non-tender, non-distended, no masses palpated, no HSM and normal bowel sounds [de-identified] : 2-3+edema B/L

## 2020-06-02 NOTE — HISTORY OF PRESENT ILLNESS
[de-identified] : Her diet hasn't been good.  She walks with a cane.  She can't exercise.\par \par 1.5-2 months ago she had a low grade fever and cough which lasted up to three weeks.  It resolved and she has no symptoms now.   [FreeTextEntry1] : The patient is here for a routine visit.

## 2020-06-02 NOTE — ASSESSMENT
[FreeTextEntry1] : The BP is acceptable at 140/65.\par \par She has significant leg edema B/L which doesn't seem to bother her much.  Furosemide was advised but she refuses.  Leg elevation and decreased salt advised.\par \par Check a HgBA1c- last 7.8.  She is taking Janumet BID but she is not on Repaglinide.  \par \par Monitor Cr- last 1.51.\par \par Check lipids- she didn't go to the lipid center or pursue treating the lipids which was again urged.  \par \par Check TFTs on Levothyroxine.\par \par Check CBC- anemia.\par \par Check a COVID-19 antibody. Note she had a URI for up to three weeks 1-2 months ago.\par \par Mammogram urged.  \par \par She should follow-up with Dr. Martin.  She doesn't know when she was there last.

## 2020-06-09 ENCOUNTER — RX RENEWAL (OUTPATIENT)
Age: 75
End: 2020-06-09

## 2020-07-28 ENCOUNTER — LABORATORY RESULT (OUTPATIENT)
Age: 75
End: 2020-07-28

## 2020-07-28 ENCOUNTER — APPOINTMENT (OUTPATIENT)
Dept: NEPHROLOGY | Facility: CLINIC | Age: 75
End: 2020-07-28
Payer: MEDICARE

## 2020-07-28 VITALS
SYSTOLIC BLOOD PRESSURE: 166 MMHG | HEIGHT: 61 IN | RESPIRATION RATE: 16 BRPM | DIASTOLIC BLOOD PRESSURE: 74 MMHG | BODY MASS INDEX: 40.4 KG/M2 | HEART RATE: 67 BPM | WEIGHT: 214 LBS

## 2020-07-28 DIAGNOSIS — N20.0 CALCULUS OF KIDNEY: ICD-10-CM

## 2020-07-28 PROCEDURE — 99205 OFFICE O/P NEW HI 60 MIN: CPT

## 2020-07-28 NOTE — PHYSICAL EXAM
[General Appearance - Alert] : alert [General Appearance - In No Acute Distress] : in no acute distress [General Appearance - Well Nourished] : well nourished [General Appearance - Well Developed] : well developed [General Appearance - Well-Appearing] : healthy appearing [Sclera] : the sclera and conjunctiva were normal [PERRL With Normal Accommodation] : pupils were equal in size, round, and reactive to light [Extraocular Movements] : extraocular movements were intact [Outer Ear] : the ears and nose were normal in appearance [Neck Appearance] : the appearance of the neck was normal [Oropharynx] : the oropharynx was normal [Neck Cervical Mass (___cm)] : no neck mass was observed [Heart Rate And Rhythm] : heart rate was normal and rhythm regular [Heart Sounds Gallop] : no gallops [Heart Sounds] : normal S1 and S2 [Murmurs] : no murmurs [Heart Sounds Pericardial Friction Rub] : no pericardial rub [Full Pulse] : the pedal pulses are present [Anasarca] : anasarca edema present [___ +] : bilateral [unfilled]+ pitting edema to the knees [Bowel Sounds] : normal bowel sounds [Abdomen Soft] : soft [Abdomen Tenderness] : non-tender [Abdomen Mass (___ Cm)] : no abdominal mass palpated [Cervical Lymph Nodes Enlarged Posterior Bilaterally] : posterior cervical [Cervical Lymph Nodes Enlarged Anterior Bilaterally] : anterior cervical [Supraclavicular Lymph Nodes Enlarged Bilaterally] : supraclavicular [No CVA Tenderness] : no ~M costovertebral angle tenderness [Abnormal Walk] : normal gait [Nail Clubbing] : no clubbing  or cyanosis of the fingernails [Motor Tone] : muscle strength and tone were normal [Skin Color & Pigmentation] : normal skin color and pigmentation [Skin Turgor] : normal skin turgor [] : no rash [Sensation] : the sensory exam was normal to light touch and pinprick [No Focal Deficits] : no focal deficits [Oriented To Time, Place, And Person] : oriented to person, place, and time [Impaired Insight] : insight and judgment were intact [Affect] : the affect was normal [FreeTextEntry1] : weeping seen from legs bilaterally

## 2020-07-31 ENCOUNTER — RESULT REVIEW (OUTPATIENT)
Age: 75
End: 2020-07-31

## 2020-07-31 ENCOUNTER — APPOINTMENT (OUTPATIENT)
Dept: CT IMAGING | Facility: IMAGING CENTER | Age: 75
End: 2020-07-31
Payer: MEDICARE

## 2020-07-31 ENCOUNTER — OUTPATIENT (OUTPATIENT)
Dept: OUTPATIENT SERVICES | Facility: HOSPITAL | Age: 75
LOS: 1 days | End: 2020-07-31
Payer: MEDICARE

## 2020-07-31 DIAGNOSIS — R60.0 LOCALIZED EDEMA: ICD-10-CM

## 2020-07-31 PROCEDURE — 71250 CT THORAX DX C-: CPT

## 2020-07-31 PROCEDURE — 74176 CT ABD & PELVIS W/O CONTRAST: CPT | Mod: 26

## 2020-07-31 PROCEDURE — 71250 CT THORAX DX C-: CPT | Mod: 26

## 2020-07-31 PROCEDURE — 74176 CT ABD & PELVIS W/O CONTRAST: CPT

## 2020-08-05 ENCOUNTER — APPOINTMENT (OUTPATIENT)
Dept: NEPHROLOGY | Facility: CLINIC | Age: 75
End: 2020-08-05

## 2020-08-05 LAB
ALBUMIN MFR SERPL ELPH: 56.9 %
ALBUMIN SERPL-MCNC: 4 G/DL
ALBUMIN/GLOB SERPL: 1.3 RATIO
ALPHA1 GLOB MFR SERPL ELPH: 6.2 %
ALPHA1 GLOB SERPL ELPH-MCNC: 0.4 G/DL
ALPHA2 GLOB MFR SERPL ELPH: 13.9 %
ALPHA2 GLOB SERPL ELPH-MCNC: 1 G/DL
ANA SER IF-ACNC: NEGATIVE
ANION GAP SERPL CALC-SCNC: 15 MMOL/L
B-GLOBULIN MFR SERPL ELPH: 11.4 %
B-GLOBULIN SERPL ELPH-MCNC: 0.8 G/DL
BUN SERPL-MCNC: 27 MG/DL
C3 SERPL-MCNC: 122 MG/DL
C4 SERPL-MCNC: 38 MG/DL
CALCIUM SERPL-MCNC: 10 MG/DL
CHLORIDE SERPL-SCNC: 95 MMOL/L
CO2 SERPL-SCNC: 28 MMOL/L
CREAT SERPL-MCNC: 1.23 MG/DL
DEPRECATED KAPPA LC FREE/LAMBDA SER: 1.28 RATIO
ERYTHROCYTE [SEDIMENTATION RATE] IN BLOOD BY WESTERGREN METHOD: 52 MM/HR
GAMMA GLOB FLD ELPH-MCNC: 0.8 G/DL
GAMMA GLOB MFR SERPL ELPH: 11.6 %
GLUCOSE SERPL-MCNC: 154 MG/DL
HAV IGM SER QL: NONREACTIVE
HBV CORE IGM SER QL: NONREACTIVE
HBV SURFACE AG SER QL: NONREACTIVE
HCV AB SER QL: NONREACTIVE
HCV S/CO RATIO: 0.13 S/CO
HGB A MFR BLD: 98 %
HGB A2 MFR BLD: 2 %
HGB FRACT BLD-IMP: NORMAL
HIV1+2 AB SPEC QL IA.RAPID: NONREACTIVE
IGA SER QL IEP: 134 MG/DL
IGG SER QL IEP: 809 MG/DL
IGM SER QL IEP: 174 MG/DL
INTERPRETATION SERPL IEP-IMP: NORMAL
KAPPA LC CSF-MCNC: 2.01 MG/DL
KAPPA LC SERPL-MCNC: 2.58 MG/DL
M PROTEIN SPEC IFE-MCNC: NORMAL
MPO AB + PR3 PNL SER: NORMAL
PHOSPHOLIPASE A2 RECEPTOR ELISA: <2 RU/ML
PHOSPHOLIPASE A2 RECEPTOR IFA: NEGATIVE
POTASSIUM SERPL-SCNC: 5.1 MMOL/L
PROT SERPL-MCNC: 7 G/DL
PROT SERPL-MCNC: 7 G/DL
PROTEINASE3 AB SER IA-ACNC: <5 UNITS
PROTEINASE3 AB SER-ACNC: NEGATIVE
SODIUM SERPL-SCNC: 138 MMOL/L
URATE SERPL-MCNC: 9.2 MG/DL

## 2020-08-11 PROBLEM — D72.829 LEUCOCYTOSIS: Status: ACTIVE | Noted: 2019-05-14

## 2020-08-11 NOTE — HISTORY OF PRESENT ILLNESS
[Disease:__________________________] : Disease: [unfilled] [de-identified] : The patient was referred by Dr Tipton for elevation of the white cell count.\par The patient was seen in [facility] on 06/29 /2018 and she remained in the hospital for 30 days. She had kidney stone, sepsis; she had coma for 3 days. She remained on antibiotics. She had a readmission to [facility] in July 2018 for C diff (this was after discharge from rehabilitation). 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 She has had mild anemia in association with her diagnosis of diabetes. She is not diagnosed to have renal insufficiency although her predicted G F R is 40 ml/min. \par She was diagnosed to have diabetes 10 years ago and she was placed on insulin about 4 years ago when she had kidney stones. \par Patient also admitted to\par Basal cell cancer resection above left eyebrow 15 years ago \par  [FreeTextEntry1] : follow up visit [de-identified] : She has not had hospitalization since her last visit in may 2019; She has tested normal for J AK, B CR A B L, and flow cytometry,\par She has noted subcutaneous on thighs and upper arms. The patient has given insulin only in the arms.\par No fever and no weight loss

## 2020-08-13 ENCOUNTER — APPOINTMENT (OUTPATIENT)
Dept: HEMATOLOGY ONCOLOGY | Facility: CLINIC | Age: 75
End: 2020-08-13

## 2020-08-13 ENCOUNTER — OUTPATIENT (OUTPATIENT)
Dept: OUTPATIENT SERVICES | Facility: HOSPITAL | Age: 75
LOS: 1 days | Discharge: HOME | End: 2020-08-13

## 2020-08-13 DIAGNOSIS — D72.829 ELEVATED WHITE BLOOD CELL COUNT, UNSPECIFIED: ICD-10-CM

## 2020-09-04 ENCOUNTER — APPOINTMENT (OUTPATIENT)
Dept: INTERNAL MEDICINE | Facility: CLINIC | Age: 75
End: 2020-09-04
Payer: MEDICARE

## 2020-09-04 VITALS
WEIGHT: 207 LBS | HEART RATE: 78 BPM | TEMPERATURE: 97.8 F | BODY MASS INDEX: 39.08 KG/M2 | OXYGEN SATURATION: 92 % | HEIGHT: 61 IN

## 2020-09-04 PROCEDURE — 99496 TRANSJ CARE MGMT HIGH F2F 7D: CPT | Mod: 25

## 2020-09-04 PROCEDURE — 36415 COLL VENOUS BLD VENIPUNCTURE: CPT

## 2020-09-04 RX ORDER — FUROSEMIDE 20 MG/1
20 TABLET ORAL
Qty: 90 | Refills: 3 | Status: DISCONTINUED | COMMUNITY
Start: 2020-07-29 | End: 2020-09-04

## 2020-09-08 ENCOUNTER — APPOINTMENT (OUTPATIENT)
Dept: ENDOCRINOLOGY | Facility: CLINIC | Age: 75
End: 2020-09-08
Payer: MEDICARE

## 2020-09-08 VITALS
TEMPERATURE: 98.2 F | HEART RATE: 75 BPM | OXYGEN SATURATION: 95 % | DIASTOLIC BLOOD PRESSURE: 80 MMHG | SYSTOLIC BLOOD PRESSURE: 128 MMHG

## 2020-09-08 VITALS — SYSTOLIC BLOOD PRESSURE: 150 MMHG | DIASTOLIC BLOOD PRESSURE: 60 MMHG

## 2020-09-08 DIAGNOSIS — E03.9 HYPOTHYROIDISM, UNSPECIFIED: ICD-10-CM

## 2020-09-08 DIAGNOSIS — E04.1 NONTOXIC SINGLE THYROID NODULE: ICD-10-CM

## 2020-09-08 DIAGNOSIS — E78.5 HYPERLIPIDEMIA, UNSPECIFIED: ICD-10-CM

## 2020-09-08 PROCEDURE — 99214 OFFICE O/P EST MOD 30 MIN: CPT

## 2020-09-08 RX ORDER — REPAGLINIDE 1 MG/1
1 TABLET ORAL
Qty: 270 | Refills: 3 | Status: DISCONTINUED | COMMUNITY
Start: 2019-10-10 | End: 2020-09-08

## 2020-09-08 RX ORDER — SITAGLIPTIN 50 MG/1
50 TABLET, FILM COATED ORAL
Qty: 90 | Refills: 2 | Status: ACTIVE | COMMUNITY
Start: 2020-09-08 | End: 1900-01-01

## 2020-09-08 NOTE — REVIEW OF SYSTEMS
[Blurred Vision] : blurred vision [Lower Ext Edema] : lower extremity edema [Difficulty Walking] : difficulty walking [Poor Balance] : poor balance [All other systems negative] : All other systems negative [Fever] : no fever [Chills] : no chills [Chest Pain] : no chest pain [Palpitations] : no palpitations [Shortness Of Breath] : no shortness of breath [Cough] : no cough [Nausea] : no nausea [Constipation] : no constipation [Vomiting] : no vomiting [Polyuria] : no polyuria [Polydipsia] : no polydipsia

## 2020-09-08 NOTE — PHYSICAL EXAM
[Normal] : normal gait, coordination grossly intact, no focal deficits and deep tendon reflexes were 2+ and symmetric [de-identified] : chronic leg edema, 2+ B/L

## 2020-09-08 NOTE — HISTORY OF PRESENT ILLNESS
[FreeTextEntry1] : chief complaint: DM2, hypothyroidism \par \par 75-year-old female for followup of type 2 diabetes and hypothyroidism. Previously followed with Dr. Boyer, and now seeing Dr. Petersen. HbA1c 8.2% in 9/2020. Was on Levemir 76 units qhs and Janumet  once daily. She was hospitalized at [facility] in early 8/2020 for CHF exacerbation, and was discharged on Lantus 20 units qhs and Humalog 2/2/10. States that Janumet was stopped due her CHF and admission for CHF. Checks BG once a day, in the AM, and has noted that her AM BG in the last few weeks has been in the 200's to 300's.  She has neuropathy and CKD stage 3b. Unclear if she has retinopathy, reports having some blurry vision in her right eye with associated erythema, and will be seeing optho this week - now wearing an eye patch over the right eye. \par \par She has had DM2 since around 2006. She has been under stress since her  passed away in august 2017. \par \par Also with hypothyroidism and takes levothyroxine 88 mcg everyday for hypothyroidism. She thinks overall her weight has been stable. TFTs normal in 5/2020. Also with right 1.2 cm thyroid nodule seen on ultrasound in 2017, no recent thyroid ultrasound available.\par \par BP at goal, on diuretics.\par \par Not on medications for HLD due to prior history of cramping.  in 9/2020.\par \par She just had blood work done last week and prefers to have any further testing done at next visit.\par

## 2020-09-08 NOTE — ASSESSMENT
[FreeTextEntry1] : The patient had a recent admission to [facility] with CHF.  She now appears comfortable and her lung exam is clear.  She is on Torsemide and she was urged to be compliant with meds.  She sees Dr. Martin.  She also saw Dr. Abraham Razo at [facility] who she says is a CHF specialist. Check a BNP.\par \par She needs endocrine follow-up and we will help her arrange an appointment.  Continue medication for now as per the [facility] discharge.  Stay off Metformin given the CHF. Check a HgBA1c.\par \par Diet and exercise discussed.  \par \par Check renal function and she should see her nephrologist.\par \par She is also seeing a pulmonologist next week who she had seen in [facility].  I advised the patient to have the [facility] records sent here for review. \par

## 2020-09-08 NOTE — HISTORY OF PRESENT ILLNESS
[FreeTextEntry2] : The patient comes in to follow-up after the recent hospitalization at [facility].  She was admitted on 8/7 for about ten days.  She had CHF and she says renal function was worse.  She improved with Furosemide.  She is now comfortable and she denies chest pain, dyspnea, orthopnea.\par \par She had been seen here in May, 2020 and shortly after that she saw Dr. Martin.  She developed worse B/L leg edema and dyspnea and she saw a nephrologist, Dr. Juares.  She was felt to be in CHF but the patient refused diuretics at that time.  She clinically became worse and was admitted.\par \par She says sugars have been worse at 220-280.  She is off the Janumet (probably because the Metformin needed to be stopped because of the CHF.)  She hasn't been careful with her diet. She doesn't exercise.

## 2020-09-08 NOTE — ASSESSMENT
[Self Monitoring of Blood Glucose] : self monitoring of blood glucose [Retinopathy Screening] : Patient was referred to ophthalmology for retinopathy screening [FreeTextEntry1] : 75 year old woman with uncontrolled DM2, hypothyroidism, HLD, hypothyroidism, thyroid nodule\par \par 1. DM2, uncontrolled: \par - HbA1c 8.2% now, goal is less 7% without hypoglycemia\par - patient prefers to resume prior regimen - will resume Levemir 70 units qhs and Januvia 50 mg daily. Will not resume Metformin due to CHF/admissions for CHF. If additional agent is needed, will start Prandin before meals.\par - recommended she check BG twice a day and alternate the times of the day that she checks - send BG log in 2 weeks\par - to see optho this week\par - has CKD stage 3b, check urine microalbumin/creatinine next visit\par - c/w dietary changes \par \par 2. Hypothyroidism, acquired:\par - c/w LT4 88 mcg daily\par - TFTs normal in 5/2020\par \par 3. HLD:\par - LDL severely elevated but patient declines statin therapy due to intolerance\par \par 4. Thyroid nodule: \par - TFTs normal in 5/2020\par - obtain official thyroid ultrasound at next visit\par \par Return visit in 3 months with Dr. Petersen. Patient declining further labs/work up today, will obtain labs/thyroid ultrasound at next visit.

## 2020-09-08 NOTE — PHYSICAL EXAM
[Alert] : alert [Well Nourished] : well nourished [Healthy Appearance] : healthy appearance [No Acute Distress] : no acute distress [Normal Sclera/Conjunctiva] : normal sclera/conjunctiva [No Proptosis] : no proptosis [Normal Outer Ear/Nose] : the ears and nose were normal in appearance [Normal Hearing] : hearing was normal [No Neck Mass] : no neck mass was observed [Supple] : the neck was supple [No Respiratory Distress] : no respiratory distress [No Accessory Muscle Use] : no accessory muscle use [Normal Rate and Effort] : normal respiratory rate and effort [Clear to Auscultation] : lungs were clear to auscultation bilaterally [Normal S1, S2] : normal S1 and S2 [Normal Rate] : heart rate was normal [Regular Rhythm] : with a regular rhythm [Normal Bowel Sounds] : normal bowel sounds [Not Tender] : non-tender [Not Distended] : not distended [Soft] : abdomen soft [No Clubbing, Cyanosis] : no clubbing  or cyanosis of the fingernails [Right Foot Was Examined] : right foot ~C was examined [No Involuntary Movements] : no involuntary movements were seen [Left Foot Was Examined] : left foot ~C was examined [Diminished Throughout Both Feet] : diminished tactile sensation with monofilament testing throughout both feet [#1 Diminished] : number 1 was diminished [#2 Diminished] : number 2 was diminished [#3 Diminished] : number 3 was diminished [#6 Diminished] : number 6 was diminished [#5 Diminished] : number 5 was diminished [#4 Diminished] : number 4 was diminished [#8 Diminished] : number 8 was diminished [#9 Diminished] : number 9 was diminished [#7 Diminished] : number 7 was diminished [#10 Diminished] : number 10 was diminished [Normal Affect] : the affect was normal [Oriented x3] : oriented to person, place, and time [Normal Insight/Judgement] : insight and judgment were intact [Normal Mood] : the mood was normal [de-identified] : wearing right eye patch  [de-identified] : 2+ edema bilateral lower extremities, difficult to palpate pedal pulses [de-identified] : ambulates with walker [FreeTextEntry3] : unable to palpate due to pedal edema  [FreeTextEntry7] : unable to palpate due to pedal edema  [de-identified] : decreased sensation on monofilament testing

## 2020-09-16 LAB
ALBUMIN SERPL ELPH-MCNC: 4.5 G/DL
ALP BLD-CCNC: 72 U/L
ALT SERPL-CCNC: 19 U/L
ANION GAP SERPL CALC-SCNC: 14 MMOL/L
AST SERPL-CCNC: 21 U/L
BASOPHILS # BLD AUTO: 0.05 K/UL
BASOPHILS NFR BLD AUTO: 0.4 %
BILIRUB SERPL-MCNC: 0.4 MG/DL
BUN SERPL-MCNC: 35 MG/DL
CALCIUM SERPL-MCNC: 9.7 MG/DL
CHLORIDE SERPL-SCNC: 91 MMOL/L
CO2 SERPL-SCNC: 30 MMOL/L
CREAT SERPL-MCNC: 1.24 MG/DL
EOSINOPHIL # BLD AUTO: 0.28 K/UL
EOSINOPHIL NFR BLD AUTO: 2.3 %
ESTIMATED AVERAGE GLUCOSE: 189 MG/DL
GLUCOSE SERPL-MCNC: 317 MG/DL
HBA1C MFR BLD HPLC: 8.2 %
HCT VFR BLD CALC: 29 %
HGB BLD-MCNC: 8.8 G/DL
IMM GRANULOCYTES NFR BLD AUTO: 1.5 %
LYMPHOCYTES # BLD AUTO: 2.49 K/UL
LYMPHOCYTES NFR BLD AUTO: 20.9 %
MAN DIFF?: NORMAL
MCHC RBC-ENTMCNC: 25.8 PG
MCHC RBC-ENTMCNC: 30.3 GM/DL
MCV RBC AUTO: 85 FL
MONOCYTES # BLD AUTO: 0.69 K/UL
MONOCYTES NFR BLD AUTO: 5.8 %
NEUTROPHILS # BLD AUTO: 8.25 K/UL
NEUTROPHILS NFR BLD AUTO: 69.1 %
NT-PROBNP SERPL-MCNC: 130 PG/ML
PLATELET # BLD AUTO: 254 K/UL
POTASSIUM SERPL-SCNC: 4.6 MMOL/L
PROT SERPL-MCNC: 6.8 G/DL
RBC # BLD: 3.41 M/UL
RBC # FLD: 15.9 %
SODIUM SERPL-SCNC: 136 MMOL/L
WBC # FLD AUTO: 11.94 K/UL

## 2020-10-02 ENCOUNTER — RX RENEWAL (OUTPATIENT)
Age: 75
End: 2020-10-02

## 2020-10-02 RX ORDER — REPAGLINIDE 0.5 MG/1
0.5 TABLET ORAL 3 TIMES DAILY
Qty: 270 | Refills: 1 | Status: ACTIVE | COMMUNITY
Start: 2020-10-01 | End: 1900-01-01

## 2020-10-04 ENCOUNTER — RX RENEWAL (OUTPATIENT)
Age: 75
End: 2020-10-04

## 2020-10-09 ENCOUNTER — LABORATORY RESULT (OUTPATIENT)
Age: 75
End: 2020-10-09

## 2020-10-09 ENCOUNTER — APPOINTMENT (OUTPATIENT)
Dept: INTERNAL MEDICINE | Facility: CLINIC | Age: 75
End: 2020-10-09
Payer: MEDICARE

## 2020-10-09 VITALS
BODY MASS INDEX: 40.4 KG/M2 | WEIGHT: 214 LBS | OXYGEN SATURATION: 95 % | HEART RATE: 85 BPM | TEMPERATURE: 98 F | HEIGHT: 61 IN

## 2020-10-09 VITALS — SYSTOLIC BLOOD PRESSURE: 130 MMHG | DIASTOLIC BLOOD PRESSURE: 60 MMHG

## 2020-10-09 PROCEDURE — 99213 OFFICE O/P EST LOW 20 MIN: CPT | Mod: 25

## 2020-10-09 PROCEDURE — 36415 COLL VENOUS BLD VENIPUNCTURE: CPT

## 2020-10-09 PROCEDURE — 90662 IIV NO PRSV INCREASED AG IM: CPT

## 2020-10-09 PROCEDURE — G0008: CPT

## 2020-10-09 NOTE — ASSESSMENT
[FreeTextEntry1] : Anemia- recheck today with iron studies, B-12 and folate.  Consider further evaluation if not back to baseline.  No symptoms to suggest bleeding.\par \par Flu vaccine today.\par \par We discussed the recent endocrine evaluation and recommendations and she was advised to follow their recommendations.

## 2020-10-13 LAB
ALBUMIN MFR SERPL ELPH: 57 %
ALBUMIN SERPL-MCNC: 3.8 G/DL
ALBUMIN/GLOB SERPL: 1.4 RATIO
ALPHA1 GLOB MFR SERPL ELPH: 5.2 %
ALPHA1 GLOB SERPL ELPH-MCNC: 0.3 G/DL
ALPHA2 GLOB MFR SERPL ELPH: 14.6 %
ALPHA2 GLOB SERPL ELPH-MCNC: 1 G/DL
B-GLOBULIN MFR SERPL ELPH: 10.6 %
B-GLOBULIN SERPL ELPH-MCNC: 0.7 G/DL
BASOPHILS # BLD AUTO: 0.08 K/UL
BASOPHILS NFR BLD AUTO: 0.5 %
EOSINOPHIL # BLD AUTO: 0.27 K/UL
EOSINOPHIL NFR BLD AUTO: 1.8 %
FERRITIN SERPL-MCNC: 102 NG/ML
FOLATE SERPL-MCNC: 12.3 NG/ML
GAMMA GLOB FLD ELPH-MCNC: 0.8 G/DL
GAMMA GLOB MFR SERPL ELPH: 12.6 %
HCT VFR BLD CALC: 28.7 %
HGB BLD-MCNC: 8.6 G/DL
IMM GRANULOCYTES NFR BLD AUTO: 2.7 %
INTERPRETATION SERPL IEP-IMP: NORMAL
IRON SATN MFR SERPL: 13 %
IRON SERPL-MCNC: 40 UG/DL
LYMPHOCYTES # BLD AUTO: 2.78 K/UL
LYMPHOCYTES NFR BLD AUTO: 18.1 %
MAN DIFF?: NORMAL
MCHC RBC-ENTMCNC: 25.8 PG
MCHC RBC-ENTMCNC: 30 GM/DL
MCV RBC AUTO: 86.2 FL
MONOCYTES # BLD AUTO: 0.8 K/UL
MONOCYTES NFR BLD AUTO: 5.2 %
NEUTROPHILS # BLD AUTO: 11.03 K/UL
NEUTROPHILS NFR BLD AUTO: 71.7 %
PLATELET # BLD AUTO: 316 K/UL
PROT SERPL-MCNC: 6.6 G/DL
PROT SERPL-MCNC: 6.6 G/DL
RBC # BLD: 3.33 M/UL
RBC # FLD: 15.8 %
TIBC SERPL-MCNC: 314 UG/DL
UIBC SERPL-MCNC: 274 UG/DL
VIT B12 SERPL-MCNC: 905 PG/ML
WBC # FLD AUTO: 15.38 K/UL

## 2020-11-09 ENCOUNTER — APPOINTMENT (OUTPATIENT)
Dept: INTERNAL MEDICINE | Facility: CLINIC | Age: 75
End: 2020-11-09
Payer: MEDICARE

## 2020-11-09 VITALS
TEMPERATURE: 97.2 F | BODY MASS INDEX: 40.78 KG/M2 | OXYGEN SATURATION: 95 % | HEIGHT: 61 IN | HEART RATE: 84 BPM | WEIGHT: 216 LBS

## 2020-11-09 VITALS — DIASTOLIC BLOOD PRESSURE: 55 MMHG | SYSTOLIC BLOOD PRESSURE: 135 MMHG

## 2020-11-09 PROCEDURE — 36415 COLL VENOUS BLD VENIPUNCTURE: CPT

## 2020-11-09 PROCEDURE — 99214 OFFICE O/P EST MOD 30 MIN: CPT | Mod: 25

## 2020-11-09 NOTE — HISTORY OF PRESENT ILLNESS
[de-identified] : The patient comes in to follow-up regarding the anemia.  \par \par She is under a lot of stress due to the political situation and she is selling her home.\par \par She uses home supplemental oxygen and she sees her pulmonologist.\par \par She is not strict with her diet.  Weight is stable.

## 2020-11-09 NOTE — ASSESSMENT
[FreeTextEntry1] : She has a chronic but worse anemia.  Recheck today.  The cause is uncertain.  She had been advised to see her hematologist, Dr. Cheng, but the patient hasn't made the appointment and it was again advised.  She agrees.\par \par We discussed the DM and compliance with diet and medications was urged.  Check a HgBA1c.\par \par Mammogram and DEXA advised and she agrees.  \par \par The BP is ok at 135/55.\par \par She was counseled regarding the stress and anxiety.

## 2020-11-10 ENCOUNTER — OUTPATIENT (OUTPATIENT)
Dept: OUTPATIENT SERVICES | Facility: HOSPITAL | Age: 75
LOS: 1 days | Discharge: HOME | End: 2020-11-10

## 2020-11-10 DIAGNOSIS — D72.829 ELEVATED WHITE BLOOD CELL COUNT, UNSPECIFIED: ICD-10-CM

## 2020-11-11 NOTE — PHYSICAL EXAM
[Restricted in physically strenuous activity but ambulatory and able to carry out work of a light or sedentary nature] : Status 1- Restricted in physically strenuous activity but ambulatory and able to carry out work of a light or sedentary nature, e.g., light house work, office work [Normal] : affect appropriate [de-identified] : patient refused physical examination

## 2020-11-11 NOTE — REVIEW OF SYSTEMS
[Fatigue] : fatigue [Vision Problems] : vision problems [Lower Ext Edema] : lower extremity edema [Shortness Of Breath] : shortness of breath [SOB on Exertion] : shortness of breath during exertion [Skin Rash] : no skin rash [Skin Wound] : no skin wound [Difficulty Walking] : difficulty walking [Anxiety] : anxiety [Depression] : no depression [Proptosis] : no proptosis [Hot Flashes] : no hot flashes [Muscle Weakness] : no muscle weakness [Deepening Of The Voice] : no deepening of the voice [Easy Bleeding] : no tendency for easy bleeding [Easy Bruising] : no tendency for easy bruising [Swollen Glands] : swollen glands [Negative] : Allergic/Immunologic [de-identified] : she uses a cane [de-identified] : hypothyroidism

## 2020-11-11 NOTE — ASSESSMENT
[FreeTextEntry1] : 74 year old female with a history of hypothyroidism and an elevation of the white cell count. Examination of the peripheral smear showed normal findings.\par Causes of mild anemia present since 2016 may include diabetes and chronic disease. According to the patient she has not had bleeding in rectum. She did had kidNEy bleeding and she was transfused at [facility].\par The patient probably has anemia of chronic disease (diabetes). White blood cell elevation is showing normal findings on flow cytometry.\par She will continue on her current diet and monitor her blood sugar. Vital signs were reviewed and patient is reminded to continue on her blood pressure medication.\par RTC 6 months [Supportive] : Goals of care discussed with patient: Supportive [Palliative Care Plan] : not applicable at this time

## 2020-11-11 NOTE — HISTORY OF PRESENT ILLNESS
[FreeTextEntry1] : follow up visit [de-identified] : She has not had hospitalization since her last visit in may 2019; She has tested normal for J AK, B CR A B L, and flow cytometry,\par She has noted subcutaneous on thighs and upper arms. The patient has given insulin only in the arms.\par No fever and no weight loss [Date: ____________] : Patient's last distress assessment performed on [unfilled]. [0 - No Distress] : Distress Level: 0 [Disease:__________________________] : Disease: [unfilled] [de-identified] : The patient was referred by Dr Tipton for elevation of the white cell count.\par The patient was seen in [facility] on 06/29 /2018 and she remained in the hospital for 30 days. She had kidney stone, sepsis; she had coma for 3 days. She remained on antibiotics. She had a readmission to [facility] in July 2018 for C diff (this was after discharge from rehabilitation). 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 She has had mild anemia in association with her diagnosis of diabetes. She is not diagnosed to have renal insufficiency although her predicted G F R is 40 ml/min. \par She was diagnosed to have diabetes 10 years ago and she was placed on insulin about 4 years ago when she had kidney stones. \par Patient also admitted to\par Basal cell cancer resection above left eyebrow 15 years ago \par  [Cardiovascular] : Cardiovascular [Constitutional] : Constitutional [ENT] : ENT [Dermatologic] : Dermatologic [Endocrine] : Endocrine [Gastrointestinal] : Gastrointestinal [Genitourinary] : Genitourinary [Gynecologic] : Gynecologic [Infectious] : Infectious [Musculoskeletal] : Musculoskeletal [Neurologic] : Neurologic [Pain] : Pain [Pulmonary] : Pulmonary [Hematologic] : Hematologic

## 2020-11-12 ENCOUNTER — NON-APPOINTMENT (OUTPATIENT)
Age: 75
End: 2020-11-12

## 2020-11-12 ENCOUNTER — APPOINTMENT (OUTPATIENT)
Dept: HEMATOLOGY ONCOLOGY | Facility: CLINIC | Age: 75
End: 2020-11-12

## 2020-11-12 DIAGNOSIS — I10 ESSENTIAL (PRIMARY) HYPERTENSION: ICD-10-CM

## 2020-11-12 LAB
ALBUMIN SERPL ELPH-MCNC: 4.2 G/DL
ALP BLD-CCNC: 89 U/L
ALT SERPL-CCNC: 14 U/L
ANION GAP SERPL CALC-SCNC: 14 MMOL/L
AST SERPL-CCNC: 16 U/L
BASOPHILS # BLD AUTO: 0.05 K/UL
BASOPHILS NFR BLD AUTO: 0.3 %
BILIRUB SERPL-MCNC: 0.3 MG/DL
BUN SERPL-MCNC: 38 MG/DL
CALCIUM SERPL-MCNC: 9.8 MG/DL
CHLORIDE SERPL-SCNC: 95 MMOL/L
CO2 SERPL-SCNC: 30 MMOL/L
CREAT SERPL-MCNC: 1.68 MG/DL
EOSINOPHIL # BLD AUTO: 0.25 K/UL
EOSINOPHIL NFR BLD AUTO: 1.7 %
ESTIMATED AVERAGE GLUCOSE: 217 MG/DL
FERRITIN SERPL-MCNC: 81 NG/ML
GLUCOSE SERPL-MCNC: 232 MG/DL
HBA1C MFR BLD HPLC: 9.2 %
HCT VFR BLD CALC: 29.2 %
HGB BLD-MCNC: 8.6 G/DL
IMM GRANULOCYTES NFR BLD AUTO: 3.2 %
IRON SATN MFR SERPL: 10 %
IRON SERPL-MCNC: 37 UG/DL
LYMPHOCYTES # BLD AUTO: 2.88 K/UL
LYMPHOCYTES NFR BLD AUTO: 19.6 %
MAN DIFF?: NORMAL
MCHC RBC-ENTMCNC: 26.6 PG
MCHC RBC-ENTMCNC: 29.5 GM/DL
MCV RBC AUTO: 90.4 FL
MONOCYTES # BLD AUTO: 0.81 K/UL
MONOCYTES NFR BLD AUTO: 5.5 %
NEUTROPHILS # BLD AUTO: 10.2 K/UL
NEUTROPHILS NFR BLD AUTO: 69.7 %
PLATELET # BLD AUTO: 344 K/UL
POTASSIUM SERPL-SCNC: 5 MMOL/L
PROT SERPL-MCNC: 6.9 G/DL
RBC # BLD: 3.23 M/UL
RBC # FLD: 15.8 %
SODIUM SERPL-SCNC: 139 MMOL/L
TIBC SERPL-MCNC: 360 UG/DL
UIBC SERPL-MCNC: 323 UG/DL
WBC # FLD AUTO: 14.66 K/UL

## 2020-11-30 ENCOUNTER — RX RENEWAL (OUTPATIENT)
Age: 75
End: 2020-11-30

## 2020-12-03 ENCOUNTER — APPOINTMENT (OUTPATIENT)
Dept: HEMATOLOGY ONCOLOGY | Facility: CLINIC | Age: 75
End: 2020-12-03

## 2020-12-17 ENCOUNTER — APPOINTMENT (OUTPATIENT)
Dept: ENDOCRINOLOGY | Facility: CLINIC | Age: 75
End: 2020-12-17

## 2021-01-04 ENCOUNTER — OUTPATIENT (OUTPATIENT)
Dept: OUTPATIENT SERVICES | Facility: HOSPITAL | Age: 76
LOS: 1 days | Discharge: HOME | End: 2021-01-04

## 2021-01-04 DIAGNOSIS — D72.829 ELEVATED WHITE BLOOD CELL COUNT, UNSPECIFIED: ICD-10-CM

## 2021-01-07 ENCOUNTER — APPOINTMENT (OUTPATIENT)
Dept: HEMATOLOGY ONCOLOGY | Facility: CLINIC | Age: 76
End: 2021-01-07

## 2021-01-22 ENCOUNTER — APPOINTMENT (OUTPATIENT)
Dept: INTERNAL MEDICINE | Facility: CLINIC | Age: 76
End: 2021-01-22
Payer: MEDICARE

## 2021-01-22 VITALS
BODY MASS INDEX: 42.29 KG/M2 | HEIGHT: 61 IN | HEART RATE: 89 BPM | OXYGEN SATURATION: 96 % | TEMPERATURE: 97.2 F | WEIGHT: 224 LBS

## 2021-01-22 VITALS — SYSTOLIC BLOOD PRESSURE: 162 MMHG | DIASTOLIC BLOOD PRESSURE: 70 MMHG

## 2021-01-22 PROCEDURE — 99214 OFFICE O/P EST MOD 30 MIN: CPT | Mod: 25

## 2021-01-22 PROCEDURE — 36415 COLL VENOUS BLD VENIPUNCTURE: CPT

## 2021-01-22 NOTE — ASSESSMENT
[FreeTextEntry1] : The patient has weight gain, worsening B/L leg edema and dyspnea.  She likely has worse CHF.  Her lung exam is ok.  She takes Torsemide 20 mg QD and she was advised to increase it to 40 mg QD for now.  She should limit salt.  She has an appointment next week with Dr. Martin which she should keep.  If the symptoms get worse, she should go to the ER.  Check a BNP.  \par \par The lumps are soft tissue, possibly lipomas and not adenopathy.\par \par Check a HgBA1c- diet has been poor.\par \par Check lipids- has refused treatment.\par \par Monitor renal function.\par \par She has an anemia and hasn't seen her hematologist which has been advised.  Check a CBC and she was again advised to see her hematologist.

## 2021-01-22 NOTE — HISTORY OF PRESENT ILLNESS
[de-identified] : The patient comes in for a follow-up regarding the anemia and other issues.  However, she says she has had more dyspnea in the last month and she has gained 12 pounds.  She has recurrent B/L leg edema to the knee and the skin is erythematous.  She might have orthopnea.  No chest pain, cough, fever.  She says she saw her pulmonologist a few weeks ago and she was told the main issue is cardiac.  \par \par Note she had CHF a few months ago and was hospitalized.\par \par She has been taking Torsemide 20 mg QD.  She says she takes her medication.\par \par She notes soft tissue lumps in various locations and she is worried about lymphoma.  \par \par She is not strict with her diet.  She hasn't seen her endocrinologist.

## 2021-01-22 NOTE — PHYSICAL EXAM
[Normal Rate] : normal rate  [Regular Rhythm] : with a regular rhythm [Normal] : soft, non-tender, non-distended, no masses palpated, no HSM and normal bowel sounds [de-identified] : 2/6 systolic murmur [de-identified] : B/L 2+ edmea with chronic skin changes B/L

## 2021-01-30 ENCOUNTER — NON-APPOINTMENT (OUTPATIENT)
Age: 76
End: 2021-01-30

## 2021-01-30 LAB
ALBUMIN SERPL ELPH-MCNC: 4.3 G/DL
ALP BLD-CCNC: 88 U/L
ALT SERPL-CCNC: 13 U/L
ANION GAP SERPL CALC-SCNC: 15 MMOL/L
AST SERPL-CCNC: 13 U/L
BASOPHILS # BLD AUTO: 0.07 K/UL
BASOPHILS NFR BLD AUTO: 0.5 %
BILIRUB SERPL-MCNC: 0.3 MG/DL
BUN SERPL-MCNC: 42 MG/DL
CALCIUM SERPL-MCNC: 9.5 MG/DL
CHLORIDE SERPL-SCNC: 97 MMOL/L
CHOLEST SERPL-MCNC: 267 MG/DL
CO2 SERPL-SCNC: 24 MMOL/L
CREAT SERPL-MCNC: 1.67 MG/DL
EOSINOPHIL # BLD AUTO: 0.23 K/UL
EOSINOPHIL NFR BLD AUTO: 1.7 %
ESTIMATED AVERAGE GLUCOSE: 226 MG/DL
GLUCOSE SERPL-MCNC: 300 MG/DL
HBA1C MFR BLD HPLC: 9.5 %
HCT VFR BLD CALC: 28.5 %
HDLC SERPL-MCNC: 45 MG/DL
HGB BLD-MCNC: 8.5 G/DL
IMM GRANULOCYTES NFR BLD AUTO: 2.7 %
LDLC SERPL CALC-MCNC: 180 MG/DL
LYMPHOCYTES # BLD AUTO: 3.13 K/UL
LYMPHOCYTES NFR BLD AUTO: 23.1 %
MAN DIFF?: NORMAL
MCHC RBC-ENTMCNC: 25.3 PG
MCHC RBC-ENTMCNC: 29.8 GM/DL
MCV RBC AUTO: 84.8 FL
MONOCYTES # BLD AUTO: 0.75 K/UL
MONOCYTES NFR BLD AUTO: 5.5 %
NEUTROPHILS # BLD AUTO: 9 K/UL
NEUTROPHILS NFR BLD AUTO: 66.5 %
NONHDLC SERPL-MCNC: 222 MG/DL
NT-PROBNP SERPL-MCNC: 150 PG/ML
PLATELET # BLD AUTO: 344 K/UL
POTASSIUM SERPL-SCNC: 4.7 MMOL/L
PROT SERPL-MCNC: 6.7 G/DL
RBC # BLD: 3.36 M/UL
RBC # FLD: 15.4 %
SODIUM SERPL-SCNC: 137 MMOL/L
T4 FREE SERPL-MCNC: 1.2 NG/DL
TRIGL SERPL-MCNC: 211 MG/DL
TSH SERPL-ACNC: 1.58 UIU/ML
WBC # FLD AUTO: 13.54 K/UL

## 2021-03-01 ENCOUNTER — RX RENEWAL (OUTPATIENT)
Age: 76
End: 2021-03-01

## 2021-03-31 ENCOUNTER — RX RENEWAL (OUTPATIENT)
Age: 76
End: 2021-03-31

## 2021-05-27 ENCOUNTER — APPOINTMENT (OUTPATIENT)
Dept: INTERNAL MEDICINE | Facility: CLINIC | Age: 76
End: 2021-05-27
Payer: MEDICARE

## 2021-05-27 VITALS — SYSTOLIC BLOOD PRESSURE: 148 MMHG | RESPIRATION RATE: 20 BRPM | DIASTOLIC BLOOD PRESSURE: 65 MMHG

## 2021-05-27 VITALS
TEMPERATURE: 97.9 F | HEART RATE: 79 BPM | OXYGEN SATURATION: 95 % | BODY MASS INDEX: 44.37 KG/M2 | HEIGHT: 61 IN | WEIGHT: 235 LBS

## 2021-05-27 PROCEDURE — 99214 OFFICE O/P EST MOD 30 MIN: CPT

## 2021-05-27 NOTE — HISTORY OF PRESENT ILLNESS
[de-identified] : The patient comes in because she is having worse dyspnea, worse B/L leg swelling and B/L leg sores in the last week.  She was admitted to [facility] last year with CHF.  In April, 2021 she reports that she was again admitted to [facility] with CHF.  She says she responded to diuretics and felt better at discharge.  Since then she says she has gained back 20 pounds and she has worse leg swelling and dyspnea.  She is now on Torsemide 20 mg QAM and 10 mg QPM and she says her other medications are the same.  She saw Dr. Martin in the hospital but not since discharge.  She tries to take in a low sodium diet but her diet is o/w not careful.  She admits her diet is poor. \par \par She sold her house on [address] and she now lives in an apartment in [facility].

## 2021-05-27 NOTE — ASSESSMENT
[FreeTextEntry1] : The patient has recent significant weight gain, severe lower extremity edema and dyspnea, although her lung exam is ok.  She appears to again be in CHF.  She was advised to go to the hospital now- she needs IV diuresis and further management.  She understands and agrees to go but she doesn't want to go today because she has an appointment to have her hair done and she also doesn't trust her aide in her apartment by herself.  She wants to go for admission in 1-2 days.  I advised the patient that she shouldn't wait and she should go today to the ER.  She has poor dietary and possibly medication compliance as an outpatient and inpatient treatment is likely necessary.  \par \par She was also more anemic in the hospital and it's not certain if she might need a blood transfusion.\par \par She has had the COVID-19 vaccine.  \par \par Discuss a mammogram later.

## 2021-05-27 NOTE — PHYSICAL EXAM
[Normal] : soft, non-tender, non-distended, no masses palpated, no HSM and normal bowel sounds [de-identified] : Obese female mildly dyspneic at rest [de-identified] : 3+ B/L leg swelling to above the knee with chronic skin changes and vesicles.

## 2021-06-22 ENCOUNTER — RX RENEWAL (OUTPATIENT)
Age: 76
End: 2021-06-22

## 2021-08-09 ENCOUNTER — RX RENEWAL (OUTPATIENT)
Age: 76
End: 2021-08-09

## 2021-08-20 ENCOUNTER — APPOINTMENT (OUTPATIENT)
Dept: INTERNAL MEDICINE | Facility: CLINIC | Age: 76
End: 2021-08-20
Payer: MEDICARE

## 2021-08-20 VITALS
OXYGEN SATURATION: 96 % | WEIGHT: 218 LBS | HEART RATE: 77 BPM | BODY MASS INDEX: 41.16 KG/M2 | HEIGHT: 61 IN | TEMPERATURE: 97.2 F

## 2021-08-20 DIAGNOSIS — R06.00 DYSPNEA, UNSPECIFIED: ICD-10-CM

## 2021-08-20 DIAGNOSIS — R60.0 LOCALIZED EDEMA: ICD-10-CM

## 2021-08-20 PROCEDURE — 99214 OFFICE O/P EST MOD 30 MIN: CPT | Mod: 25

## 2021-08-20 PROCEDURE — 36415 COLL VENOUS BLD VENIPUNCTURE: CPT

## 2021-08-23 ENCOUNTER — NON-APPOINTMENT (OUTPATIENT)
Age: 76
End: 2021-08-23

## 2021-08-23 VITALS — SYSTOLIC BLOOD PRESSURE: 115 MMHG | DIASTOLIC BLOOD PRESSURE: 60 MMHG

## 2021-08-23 LAB
ALBUMIN SERPL ELPH-MCNC: 4.2 G/DL
ALP BLD-CCNC: 101 U/L
ALT SERPL-CCNC: 12 U/L
ANION GAP SERPL CALC-SCNC: 14 MMOL/L
AST SERPL-CCNC: 13 U/L
BASOPHILS # BLD AUTO: 0.1 K/UL
BASOPHILS NFR BLD AUTO: 0.6 %
BILIRUB SERPL-MCNC: 0.4 MG/DL
BUN SERPL-MCNC: 54 MG/DL
CALCIUM SERPL-MCNC: 9.9 MG/DL
CHLORIDE SERPL-SCNC: 91 MMOL/L
CHOLEST SERPL-MCNC: 275 MG/DL
CO2 SERPL-SCNC: 30 MMOL/L
CREAT SERPL-MCNC: 1.99 MG/DL
EOSINOPHIL # BLD AUTO: 0.3 K/UL
EOSINOPHIL NFR BLD AUTO: 1.9 %
ESTIMATED AVERAGE GLUCOSE: 240 MG/DL
FERRITIN SERPL-MCNC: 288 NG/ML
GLUCOSE SERPL-MCNC: 228 MG/DL
HBA1C MFR BLD HPLC: 10 %
HCT VFR BLD CALC: 29 %
HDLC SERPL-MCNC: 37 MG/DL
HGB BLD-MCNC: 9.2 G/DL
IMM GRANULOCYTES NFR BLD AUTO: 2.5 %
IRON SATN MFR SERPL: 11 %
IRON SERPL-MCNC: 31 UG/DL
LDLC SERPL CALC-MCNC: 162 MG/DL
LYMPHOCYTES # BLD AUTO: 3.09 K/UL
LYMPHOCYTES NFR BLD AUTO: 20 %
MAN DIFF?: NORMAL
MCHC RBC-ENTMCNC: 28.3 PG
MCHC RBC-ENTMCNC: 31.7 GM/DL
MCV RBC AUTO: 89.2 FL
MONOCYTES # BLD AUTO: 0.81 K/UL
MONOCYTES NFR BLD AUTO: 5.2 %
NEUTROPHILS # BLD AUTO: 10.77 K/UL
NEUTROPHILS NFR BLD AUTO: 69.8 %
NONHDLC SERPL-MCNC: 238 MG/DL
PLATELET # BLD AUTO: 299 K/UL
POTASSIUM SERPL-SCNC: 4.6 MMOL/L
PROT SERPL-MCNC: 7 G/DL
RBC # BLD: 3.25 M/UL
RBC # FLD: 15.3 %
SODIUM SERPL-SCNC: 135 MMOL/L
T4 FREE SERPL-MCNC: 1.1 NG/DL
TIBC SERPL-MCNC: 294 UG/DL
TRIGL SERPL-MCNC: 378 MG/DL
TSH SERPL-ACNC: 2.53 UIU/ML
UIBC SERPL-MCNC: 263 UG/DL
WBC # FLD AUTO: 15.45 K/UL

## 2021-08-23 NOTE — ASSESSMENT
[FreeTextEntry1] : The patient had another episode of CHF requiring hospitalization.  She is now home and more comfortable.  She appears comfortable.  She was advised to see Dr. Martin.  Leg elevation advised.  Continue Torsemide and other medicines.\par \par Check an H/H- anemia.\par \par Check a HgBA1c.\par \par Check TFTs on Levothyroxine.\par \par Check lipids- has refused statins.  \par \par She has had the COVID-19 vaccine.

## 2021-08-23 NOTE — HISTORY OF PRESENT ILLNESS
[FreeTextEntry1] : The patient is here for a routine visit.  [de-identified] : She was admitted to [facility] about ten days after the last visit from about 5/28-6/8 with CHF by the patient's report.  She is now comfortable and denies dyspnea now.  No chest pain or orthopnea.  She has intermittent edema.  She hasn't seen any other doctors since the hospitalization.  \par \par She has not been active and her diet isn't good. She hasn't seen Dr. Martin since the hospitalization.

## 2021-08-23 NOTE — PHYSICAL EXAM
[Normal] : normal rate, regular rhythm, normal S1 and S2 and no murmur heard [de-identified] : obese female NAD [de-identified] : chronic 2-3+ B/L leg edema, venous stasis.

## 2021-08-28 ENCOUNTER — NON-APPOINTMENT (OUTPATIENT)
Age: 76
End: 2021-08-28

## 2021-10-10 ENCOUNTER — RX RENEWAL (OUTPATIENT)
Age: 76
End: 2021-10-10

## 2021-10-11 ENCOUNTER — RX RENEWAL (OUTPATIENT)
Age: 76
End: 2021-10-11

## 2021-10-11 ENCOUNTER — NON-APPOINTMENT (OUTPATIENT)
Age: 76
End: 2021-10-11

## 2021-10-12 ENCOUNTER — RX RENEWAL (OUTPATIENT)
Age: 76
End: 2021-10-12

## 2021-10-13 ENCOUNTER — NON-APPOINTMENT (OUTPATIENT)
Age: 76
End: 2021-10-13

## 2021-10-18 ENCOUNTER — APPOINTMENT (OUTPATIENT)
Dept: INTERNAL MEDICINE | Facility: CLINIC | Age: 76
End: 2021-10-18
Payer: MEDICARE

## 2021-10-18 VITALS
BODY MASS INDEX: 40.78 KG/M2 | WEIGHT: 216 LBS | HEART RATE: 85 BPM | OXYGEN SATURATION: 96 % | TEMPERATURE: 97.9 F | HEIGHT: 61 IN

## 2021-10-18 PROCEDURE — 99214 OFFICE O/P EST MOD 30 MIN: CPT | Mod: 25

## 2021-10-18 PROCEDURE — 36415 COLL VENOUS BLD VENIPUNCTURE: CPT

## 2021-10-18 PROCEDURE — 90662 IIV NO PRSV INCREASED AG IM: CPT

## 2021-10-18 PROCEDURE — G0008: CPT

## 2021-10-19 VITALS — SYSTOLIC BLOOD PRESSURE: 145 MMHG | DIASTOLIC BLOOD PRESSURE: 65 MMHG

## 2021-10-19 NOTE — PHYSICAL EXAM
[Normal] : soft, non-tender, non-distended, no masses palpated, no HSM and normal bowel sounds [de-identified] : obese [de-identified] : chronic 2+ edema and chronic venous changes.

## 2021-10-19 NOTE — HISTORY OF PRESENT ILLNESS
[de-identified] : The patient comes in for an acute visit because she has been taking excessive Torsemide due to a pharmacy error.  She was prescribed 60 mg (three of the 20 mg tablets) but she was dispensed 100 mg tablets so she has been taking 300 mg daily.  The error was discovered last week and she held the Torsemide for three days and then restarted 60 mg 3-4 days ago.  She has been feeling ok and noted a little less swelling and slight weight loss.  She has chronic dyspnea which is not worse.  \par \par She saw her cardiologist about two months ago.

## 2021-10-19 NOTE — ASSESSMENT
[FreeTextEntry1] : The patient is here to follow-up because of excessive Torsemide intake as above.  She appears well without any evidence for serious adverse effects.  Will check a metabolic to check renal function and electrolytes.  The error has been corrected and she is now taking Torsemide 60 mg.\par \par Will monitor H/H since she is here.\par \par Check a HgBA1c which was 10.0.  An endocrinology appointment is pending.\par \par She continues to decline a statin so won't check lipids.\par \par Flu vaccine today.\par \par COVID-19 booster due next month.

## 2021-10-26 ENCOUNTER — NON-APPOINTMENT (OUTPATIENT)
Age: 76
End: 2021-10-26

## 2021-10-28 LAB
ALBUMIN SERPL ELPH-MCNC: 4.3 G/DL
ALP BLD-CCNC: 102 U/L
ALT SERPL-CCNC: 16 U/L
ANION GAP SERPL CALC-SCNC: 14 MMOL/L
AST SERPL-CCNC: 16 U/L
BASOPHILS # BLD AUTO: 0.08 K/UL
BASOPHILS NFR BLD AUTO: 0.5 %
BILIRUB SERPL-MCNC: 0.2 MG/DL
BUN SERPL-MCNC: 49 MG/DL
CALCIUM SERPL-MCNC: 9.6 MG/DL
CHLORIDE SERPL-SCNC: 95 MMOL/L
CO2 SERPL-SCNC: 27 MMOL/L
CREAT SERPL-MCNC: 1.82 MG/DL
EOSINOPHIL # BLD AUTO: 0.22 K/UL
EOSINOPHIL NFR BLD AUTO: 1.4 %
ESTIMATED AVERAGE GLUCOSE: 280 MG/DL
GLUCOSE SERPL-MCNC: 294 MG/DL
HBA1C MFR BLD HPLC: 11.4 %
HCT VFR BLD CALC: 29.2 %
HGB BLD-MCNC: 9.3 G/DL
IMM GRANULOCYTES NFR BLD AUTO: 3.7 %
LYMPHOCYTES # BLD AUTO: 3.45 K/UL
LYMPHOCYTES NFR BLD AUTO: 21.9 %
MAGNESIUM SERPL-MCNC: 2.4 MG/DL
MAN DIFF?: NORMAL
MCHC RBC-ENTMCNC: 29.2 PG
MCHC RBC-ENTMCNC: 31.8 GM/DL
MCV RBC AUTO: 91.8 FL
MONOCYTES # BLD AUTO: 0.81 K/UL
MONOCYTES NFR BLD AUTO: 5.1 %
NEUTROPHILS # BLD AUTO: 10.63 K/UL
NEUTROPHILS NFR BLD AUTO: 67.4 %
PLATELET # BLD AUTO: 320 K/UL
POTASSIUM SERPL-SCNC: 4.8 MMOL/L
PROT SERPL-MCNC: 7 G/DL
RBC # BLD: 3.18 M/UL
RBC # FLD: 14.6 %
SODIUM SERPL-SCNC: 136 MMOL/L
WBC # FLD AUTO: 15.77 K/UL

## 2021-11-11 ENCOUNTER — RX RENEWAL (OUTPATIENT)
Age: 76
End: 2021-11-11

## 2021-11-18 ENCOUNTER — APPOINTMENT (OUTPATIENT)
Dept: INTERNAL MEDICINE | Facility: CLINIC | Age: 76
End: 2021-11-18

## 2021-12-09 ENCOUNTER — RX RENEWAL (OUTPATIENT)
Age: 76
End: 2021-12-09

## 2021-12-09 RX ORDER — TORSEMIDE 20 MG/1
20 TABLET ORAL
Qty: 270 | Refills: 3 | Status: ACTIVE | COMMUNITY
Start: 2020-09-04 | End: 1900-01-01

## 2021-12-09 RX ORDER — INSULIN DETEMIR 100 [IU]/ML
100 INJECTION, SOLUTION SUBCUTANEOUS
Qty: 45 | Refills: 0 | Status: ACTIVE | COMMUNITY
Start: 2020-09-08 | End: 1900-01-01

## 2022-03-31 ENCOUNTER — RX RENEWAL (OUTPATIENT)
Age: 77
End: 2022-03-31

## 2022-04-11 PROBLEM — Z11.59 SCREENING FOR VIRAL DISEASE: Status: ACTIVE | Noted: 2020-05-26

## 2022-04-21 ENCOUNTER — APPOINTMENT (OUTPATIENT)
Dept: INTERNAL MEDICINE | Facility: CLINIC | Age: 77
End: 2022-04-21
Payer: MEDICARE

## 2022-04-21 VITALS
TEMPERATURE: 98 F | BODY MASS INDEX: 41.72 KG/M2 | OXYGEN SATURATION: 98 % | HEIGHT: 61 IN | WEIGHT: 221 LBS | HEART RATE: 78 BPM

## 2022-04-21 DIAGNOSIS — D64.9 ANEMIA, UNSPECIFIED: ICD-10-CM

## 2022-04-21 DIAGNOSIS — I50.9 HEART FAILURE, UNSPECIFIED: ICD-10-CM

## 2022-04-21 DIAGNOSIS — K76.0 FATTY (CHANGE OF) LIVER, NOT ELSEWHERE CLASSIFIED: ICD-10-CM

## 2022-04-21 PROCEDURE — 36415 COLL VENOUS BLD VENIPUNCTURE: CPT

## 2022-04-21 PROCEDURE — 99214 OFFICE O/P EST MOD 30 MIN: CPT | Mod: 25

## 2022-04-25 VITALS — SYSTOLIC BLOOD PRESSURE: 128 MMHG | DIASTOLIC BLOOD PRESSURE: 68 MMHG

## 2022-04-25 NOTE — ASSESSMENT
[FreeTextEntry1] : She has stasis dermatitis of the lower legs.  She should follow-up with her cardiologist to see if anything further can be done. The BP is fine. \par \par Check lipids and a HgBA1c.  Discussed diet, exercise, weight loss.\par \par The cause of the forearm rash isn't certain.  She can try a Hydrocortisone cream and see a dermatologist if it persists.  \par \par S/p COVID-19 vaccine but no the booster which was advised.  \par \par Monitor a CBC and metabolic.  Monitor anemia and renal function.

## 2022-04-25 NOTE — HISTORY OF PRESENT ILLNESS
[de-identified] : The patient has a chronic B/L lower leg rash.\par \par She also has a different B/L forearm rash and pruritis, pinkish.\par \par Diet is poor and she doesn't exercise.  \par \par She is living in [place].  \par \par She has chronic dyspnea with exertion. No chest pain.

## 2022-04-25 NOTE — PHYSICAL EXAM
[Normal] : soft, non-tender, non-distended, no masses palpated, no HSM and normal bowel sounds [de-identified] : Chronic lower leg edema and severe venous stasis dermatitis [de-identified] : obese [de-identified] : B/L forearm faint pinkish rash

## 2022-04-29 ENCOUNTER — NON-APPOINTMENT (OUTPATIENT)
Age: 77
End: 2022-04-29

## 2022-04-29 LAB
ALBUMIN SERPL ELPH-MCNC: 4.5 G/DL
ALP BLD-CCNC: 100 U/L
ALT SERPL-CCNC: 19 U/L
ANION GAP SERPL CALC-SCNC: 18 MMOL/L
AST SERPL-CCNC: 22 U/L
BASOPHILS # BLD AUTO: 0.06 K/UL
BASOPHILS NFR BLD AUTO: 0.5 %
BILIRUB SERPL-MCNC: 0.2 MG/DL
BUN SERPL-MCNC: 41 MG/DL
CALCIUM SERPL-MCNC: 10.3 MG/DL
CHLORIDE SERPL-SCNC: 94 MMOL/L
CHOLEST SERPL-MCNC: 285 MG/DL
CO2 SERPL-SCNC: 24 MMOL/L
CREAT SERPL-MCNC: 1.67 MG/DL
EGFR: 32 ML/MIN/1.73M2
EOSINOPHIL # BLD AUTO: 0.29 K/UL
EOSINOPHIL NFR BLD AUTO: 2.2 %
ESTIMATED AVERAGE GLUCOSE: 269 MG/DL
GLUCOSE SERPL-MCNC: 208 MG/DL
HBA1C MFR BLD HPLC: 11 %
HCT VFR BLD CALC: 30.8 %
HDLC SERPL-MCNC: 42 MG/DL
HGB BLD-MCNC: 9.2 G/DL
IMM GRANULOCYTES NFR BLD AUTO: 3.3 %
LDLC SERPL CALC-MCNC: 165 MG/DL
LYMPHOCYTES # BLD AUTO: 2.79 K/UL
LYMPHOCYTES NFR BLD AUTO: 21.2 %
MAN DIFF?: NORMAL
MCHC RBC-ENTMCNC: 26.8 PG
MCHC RBC-ENTMCNC: 29.9 GM/DL
MCV RBC AUTO: 89.8 FL
MONOCYTES # BLD AUTO: 0.71 K/UL
MONOCYTES NFR BLD AUTO: 5.4 %
NEUTROPHILS # BLD AUTO: 8.85 K/UL
NEUTROPHILS NFR BLD AUTO: 67.4 %
NONHDLC SERPL-MCNC: 243 MG/DL
PLATELET # BLD AUTO: 381 K/UL
POTASSIUM SERPL-SCNC: 5.2 MMOL/L
PROT SERPL-MCNC: 7.6 G/DL
RBC # BLD: 3.43 M/UL
RBC # FLD: 14.7 %
SODIUM SERPL-SCNC: 136 MMOL/L
TRIGL SERPL-MCNC: 390 MG/DL
WBC # FLD AUTO: 13.14 K/UL

## 2022-10-06 ENCOUNTER — APPOINTMENT (OUTPATIENT)
Dept: WOUND CARE | Facility: CLINIC | Age: 77
End: 2022-10-06